# Patient Record
Sex: FEMALE | NOT HISPANIC OR LATINO | Employment: OTHER | URBAN - METROPOLITAN AREA
[De-identification: names, ages, dates, MRNs, and addresses within clinical notes are randomized per-mention and may not be internally consistent; named-entity substitution may affect disease eponyms.]

---

## 2019-08-02 ENCOUNTER — OFFICE VISIT (OUTPATIENT)
Dept: FAMILY MEDICINE CLINIC | Facility: CLINIC | Age: 39
End: 2019-08-02

## 2019-08-02 ENCOUNTER — HOSPITAL ENCOUNTER (OUTPATIENT)
Dept: GENERAL RADIOLOGY | Facility: HOSPITAL | Age: 39
Discharge: HOME | End: 2019-08-02
Admitting: FAMILY MEDICINE

## 2019-08-02 VITALS
TEMPERATURE: 98 F | SYSTOLIC BLOOD PRESSURE: 124 MMHG | OXYGEN SATURATION: 98 % | DIASTOLIC BLOOD PRESSURE: 82 MMHG | WEIGHT: 171.13 LBS | HEIGHT: 64 IN | HEART RATE: 83 BPM | BODY MASS INDEX: 29.21 KG/M2

## 2019-08-02 DIAGNOSIS — G89.29 CHRONIC LEFT SHOULDER PAIN: Primary | ICD-10-CM

## 2019-08-02 DIAGNOSIS — M25.512 CHRONIC LEFT SHOULDER PAIN: Primary | ICD-10-CM

## 2019-08-02 DIAGNOSIS — R10.13 EPIGASTRIC PAIN: ICD-10-CM

## 2019-08-02 PROCEDURE — 99214 OFFICE O/P EST MOD 30 MIN: CPT | Performed by: FAMILY MEDICINE

## 2019-08-02 PROCEDURE — 73030 X-RAY EXAM OF SHOULDER: CPT

## 2019-08-02 NOTE — PROGRESS NOTES
Subjective   Elva Dumont is a 38 y.o. female.     Chief Complaint   Patient presents with   • Shoulder Pain   • Abdominal Pain       Abdominal Pain   This is a new problem. The current episode started 1 to 4 weeks ago. The onset quality is gradual. The problem occurs 2 to 4 times per day. The problem has been gradually worsening. The pain is located in the epigastric region. The quality of the pain is dull and cramping. The abdominal pain radiates to the epigastric region and back. Associated symptoms include arthralgias. The pain is aggravated by eating. The pain is relieved by nothing. She has tried nothing for the symptoms. There is no history of abdominal surgery, colon cancer, Crohn's disease, gallstones, GERD, irritable bowel syndrome, pancreatitis, PUD or ulcerative colitis.   Shoulder Injury    The incident occurred at work (INJURY OSSURED 6 MONTHS AGO ,PT WALKER TO THE DOOR FRAME WITH LT SHOULDR AND FOR THE LAST 2 MONTHS PAIN HAS REOCCURED). The left shoulder is affected. The incident occurred more than 1 week ago. The injury mechanism was a direct blow. The quality of the pain is described as aching. The pain radiates to the back, chest and left arm. The pain is moderate. Associated symptoms include chest pain and muscle weakness. Pertinent negatives include no numbness or tingling. The symptoms are aggravated by movement and overhead lifting. She has tried acetaminophen for the symptoms. The treatment provided mild relief.          The following portions of the patient's history were reviewed and updated as appropriate: allergies, current medications, past family history, past medical history, past social history, past surgical history and problem list.    History reviewed. No pertinent past medical history.    History reviewed. No pertinent surgical history.    History reviewed. No pertinent family history.    Social History     Socioeconomic History   • Marital status: Single     Spouse name: Not on  file   • Number of children: Not on file   • Years of education: Not on file   • Highest education level: Not on file   Tobacco Use   • Smoking status: Never Smoker   • Smokeless tobacco: Never Used   Substance and Sexual Activity   • Alcohol use: No     Frequency: Never   • Drug use: No   • Sexual activity: Yes       Review of Systems   Cardiovascular: Positive for chest pain.   Gastrointestinal: Positive for abdominal pain.   Musculoskeletal: Positive for arthralgias.        LT SHOULDER PAIN   Neurological: Negative for tingling and numbness.       Objective   Vitals:    08/02/19 0937   BP: 124/82   Pulse: 83   Temp: 98 °F (36.7 °C)   SpO2: 98%     Body mass index is 29.37 kg/m².  Physical Exam   Constitutional: She appears well-developed and well-nourished. No distress.   Cardiovascular: Normal rate and regular rhythm. Exam reveals no friction rub.   No murmur heard.  Pulmonary/Chest: Effort normal and breath sounds normal. No stridor. No respiratory distress.   Abdominal: Soft. She exhibits no mass. There is tenderness (EPIGASTRIC). There is no rebound and no guarding. No hernia.   Musculoskeletal:   LT SHOULDER RROM AND (+) IMPINGEMENT SIGH   Skin: She is not diaphoretic.   Nursing note and vitals reviewed.        Assessment/Plan   Elva was seen today for shoulder pain and abdominal pain.    Diagnoses and all orders for this visit:    Chronic left shoulder pain  -     XR Shoulder 2+ View Left  -     MRI Shoulder Left Without Contrast; Future    Epigastric pain  -     CBC & Differential  -     H. Pylori Antigen, Stool - Stool, Per Rectum  -     Comprehensive Metabolic Panel  -     Amylase  -     Lipase

## 2019-08-03 LAB
ALBUMIN SERPL-MCNC: 4.5 G/DL (ref 3.5–5.2)
ALBUMIN/GLOB SERPL: 1.7 G/DL
ALP SERPL-CCNC: 70 U/L (ref 39–117)
ALT SERPL-CCNC: 11 U/L (ref 1–33)
AMYLASE SERPL-CCNC: 49 U/L (ref 28–100)
AST SERPL-CCNC: 13 U/L (ref 1–32)
BASOPHILS # BLD AUTO: 0.04 10*3/MM3 (ref 0–0.2)
BASOPHILS NFR BLD AUTO: 0.5 % (ref 0–1.5)
BILIRUB SERPL-MCNC: 0.5 MG/DL (ref 0.2–1.2)
BUN SERPL-MCNC: 12 MG/DL (ref 6–20)
BUN/CREAT SERPL: 18.2 (ref 7–25)
CALCIUM SERPL-MCNC: 8.7 MG/DL (ref 8.6–10.5)
CHLORIDE SERPL-SCNC: 101 MMOL/L (ref 98–107)
CO2 SERPL-SCNC: 27.3 MMOL/L (ref 22–29)
CREAT SERPL-MCNC: 0.66 MG/DL (ref 0.57–1)
EOSINOPHIL # BLD AUTO: 0.16 10*3/MM3 (ref 0–0.4)
EOSINOPHIL NFR BLD AUTO: 2 % (ref 0.3–6.2)
ERYTHROCYTE [DISTWIDTH] IN BLOOD BY AUTOMATED COUNT: 13.5 % (ref 12.3–15.4)
GLOBULIN SER CALC-MCNC: 2.6 GM/DL
GLUCOSE SERPL-MCNC: 99 MG/DL (ref 65–99)
HCT VFR BLD AUTO: 41.4 % (ref 34–46.6)
HGB BLD-MCNC: 13 G/DL (ref 12–15.9)
IMM GRANULOCYTES # BLD AUTO: 0.03 10*3/MM3 (ref 0–0.05)
IMM GRANULOCYTES NFR BLD AUTO: 0.4 % (ref 0–0.5)
LIPASE SERPL-CCNC: 36 U/L (ref 13–60)
LYMPHOCYTES # BLD AUTO: 1.6 10*3/MM3 (ref 0.7–3.1)
LYMPHOCYTES NFR BLD AUTO: 20.3 % (ref 19.6–45.3)
MCH RBC QN AUTO: 27.3 PG (ref 26.6–33)
MCHC RBC AUTO-ENTMCNC: 31.4 G/DL (ref 31.5–35.7)
MCV RBC AUTO: 86.8 FL (ref 79–97)
MONOCYTES # BLD AUTO: 0.48 10*3/MM3 (ref 0.1–0.9)
MONOCYTES NFR BLD AUTO: 6.1 % (ref 5–12)
NEUTROPHILS # BLD AUTO: 5.59 10*3/MM3 (ref 1.7–7)
NEUTROPHILS NFR BLD AUTO: 70.7 % (ref 42.7–76)
NRBC BLD AUTO-RTO: 0 /100 WBC (ref 0–0.2)
PLATELET # BLD AUTO: 260 10*3/MM3 (ref 140–450)
POTASSIUM SERPL-SCNC: 4.2 MMOL/L (ref 3.5–5.2)
PROT SERPL-MCNC: 7.1 G/DL (ref 6–8.5)
RBC # BLD AUTO: 4.77 10*6/MM3 (ref 3.77–5.28)
SODIUM SERPL-SCNC: 138 MMOL/L (ref 136–145)
WBC # BLD AUTO: 7.9 10*3/MM3 (ref 3.4–10.8)

## 2019-08-09 DIAGNOSIS — R10.13 EPIGASTRIC PAIN: Primary | ICD-10-CM

## 2019-08-11 LAB — H PYLORI AG STL QL IA: POSITIVE

## 2019-09-01 ENCOUNTER — APPOINTMENT (OUTPATIENT)
Dept: MRI IMAGING | Facility: HOSPITAL | Age: 39
End: 2019-09-01

## 2019-09-20 ENCOUNTER — HOSPITAL ENCOUNTER (OUTPATIENT)
Dept: MRI IMAGING | Facility: HOSPITAL | Age: 39
Discharge: HOME | End: 2019-09-20
Admitting: FAMILY MEDICINE

## 2019-09-20 DIAGNOSIS — G89.29 CHRONIC LEFT SHOULDER PAIN: ICD-10-CM

## 2019-09-20 DIAGNOSIS — M25.512 CHRONIC LEFT SHOULDER PAIN: ICD-10-CM

## 2019-09-20 PROCEDURE — 73221 MRI JOINT UPR EXTREM W/O DYE: CPT

## 2019-09-23 DIAGNOSIS — M75.02 ADHESIVE CAPSULITIS OF LEFT SHOULDER: Primary | ICD-10-CM

## 2019-10-01 DIAGNOSIS — M77.8 CAPSULITIS OF SHOULDER, LEFT: ICD-10-CM

## 2019-10-01 DIAGNOSIS — M25.512 CHRONIC LEFT SHOULDER PAIN: Primary | ICD-10-CM

## 2019-10-01 DIAGNOSIS — G89.29 CHRONIC LEFT SHOULDER PAIN: Primary | ICD-10-CM

## 2019-10-07 ENCOUNTER — TRANSCRIBE ORDERS (OUTPATIENT)
Dept: ADMINISTRATIVE | Facility: HOSPITAL | Age: 39
End: 2019-10-07

## 2019-10-07 DIAGNOSIS — M77.8 CAPSULITIS OF LEFT SHOULDER: ICD-10-CM

## 2019-10-07 DIAGNOSIS — M25.512 CHRONIC LEFT SHOULDER PAIN: Primary | ICD-10-CM

## 2019-10-07 DIAGNOSIS — G89.29 CHRONIC LEFT SHOULDER PAIN: Primary | ICD-10-CM

## 2019-10-14 ENCOUNTER — OFFICE VISIT (OUTPATIENT)
Dept: ORTHOPEDIC SURGERY | Facility: CLINIC | Age: 39
End: 2019-10-14

## 2019-10-14 VITALS — WEIGHT: 173 LBS | HEIGHT: 64 IN | BODY MASS INDEX: 29.53 KG/M2 | TEMPERATURE: 98.1 F

## 2019-10-14 DIAGNOSIS — M75.42 IMPINGEMENT SYNDROME OF SHOULDER REGION, LEFT: ICD-10-CM

## 2019-10-14 DIAGNOSIS — M25.512 CHRONIC LEFT SHOULDER PAIN: Primary | ICD-10-CM

## 2019-10-14 DIAGNOSIS — G89.29 CHRONIC LEFT SHOULDER PAIN: Primary | ICD-10-CM

## 2019-10-14 PROCEDURE — 20610 DRAIN/INJ JOINT/BURSA W/O US: CPT | Performed by: ORTHOPAEDIC SURGERY

## 2019-10-14 PROCEDURE — 99203 OFFICE O/P NEW LOW 30 MIN: CPT | Performed by: ORTHOPAEDIC SURGERY

## 2019-10-14 RX ADMIN — METHYLPREDNISOLONE ACETATE 80 MG: 80 INJECTION, SUSPENSION INTRA-ARTICULAR; INTRALESIONAL; INTRAMUSCULAR; SOFT TISSUE at 16:12

## 2019-10-14 NOTE — PROGRESS NOTES
Patient: Elva Dumont    YOB: 1980    Medical Record Number: [id number]    Chief Complaints:   Left shoulder pain    History of Present Illness:     38 y.o. female patient who presents with left shoulder pain.  Her symptoms began about 6 months ago.  She does not recall any injury.  Pain is moderate, constant and stabbing.  Symptoms are worse with driving and at night.  Denies any weakness, numbness, shooting pain down her arm.  She is right-hand dominant.  She is self-employed.    Allergies: No Known Allergies    Medications:   Home Medications:  No current outpatient medications on file.    History reviewed. No pertinent past medical history.    History reviewed. No pertinent surgical history.    Social History     Occupational History   • Not on file   Tobacco Use   • Smoking status: Never Smoker   • Smokeless tobacco: Never Used   Substance and Sexual Activity   • Alcohol use: No     Frequency: Never   • Drug use: No   • Sexual activity: Yes      Social History     Social History Narrative   • Not on file       History reviewed. No pertinent family history.    Review of Systems:      Constitutional: Denies fever, shaking or chills   Eyes: Denies change in visual acuity   HEENT: Denies nasal congestion or sore throat   Respiratory: Denies cough or shortness of breath   Cardiovascular: Denies chest pain or edema  Endocrine: Denies tremors, palpitations, intolerance of heat or cold, polyuria, polydipsia.  GI: Denies abdominal pain, nausea, vomiting, bloody stools or diarrhea  : Denies frequency, urgency, incontinence, retention, or nocturia.  Musculoskeletal: Denies numbness, tingling or loss of motor function except as above  Integument: Denies rash, lesion or ulceration   Neurologic: Denies headache or focal weakness, deficits  Heme: Denies spontaneous or excessive bleeding, epistaxis, hematuria, melena, fatigue, enlarged or tender lymph nodes.      All other pertinent positives and negatives  "as noted above in HPI.    Physical Exam: 38 y.o. female  Vitals:    10/14/19 1554   Temp: 98.1 °F (36.7 °C)   TempSrc: Temporal   Weight: 78.5 kg (173 lb)   Height: 162.6 cm (64\")     General:  Patient is awake and alert.  Appears in no acute distress or discomfort.    Psych:  Affect and demeanor are appropriate.    Eyes:  Conjunctiva and sclera appear grossly normal.  Eyes track well and EOM seem to be intact.    Ears:  No gross abnormalities.  Hearing adequate for the exam.    Dentition:  No gross abnormalities noted.    Cardiovascular:  Regular rate and rhythm.    Lungs:  Good chest expansion.  Breathing unlabored.    Lymph:  No palpable adenopathy about neck or axilla.    Neck:  Supple.  Normal ROM.  Negative Spurling's for shoulder or arm pain.    Left shoulder is examined.  Skin is benign.  No gross abnormalities on inspection including any atrophy, swellings, or masses.  No palpable masses or adenopathy. No focal tenderness.  Full shoulder motion.  No evident instability or apprehension.  Positive Neer and Dallas manuevers.  Negative active compression maneuver.  Negative Speeds and Yergasons manuevers.  Good strength in her rotator cuff, deltoid, biceps, triceps, and .  Intact sensation throughout the arm.  Palpable radial pulse.  Good skin turgor.  Brisk cap refill.         Radiology: Outside AP and rotated AP views of the left shoulder are reviewed.  I have also reviewed the radiology report.  No significant findings noted.  MRI of the left shoulder is reviewed along with the associated report.  Overall, the MRI appears relatively benign.  There is some thickened tissue anteriorly which may or may not represent a small area of adhesive capsulitis or hypertrophied labral tissue    Assessment/Plan:   Left shoulder impingement    I am not convinced that the finding noted on her MRI accounts for her pain.  She examines like this is impingement.  I think it prudent to attempt a trial of conservative " treatment.  I recommended an injection and some therapy.  The risks, benefits and alternatives to the injection were thoroughly discussed.  She consented.  I've entered a referral to physical therapy.  I told her that I would like to give it a month of conservative treatment.  She has an MR arthrogram scheduled in about 3 weeks.  I recommended that she push that back about a month.  If she is still having symptoms at that time, then I think it is a good idea to go ahead and get the MR arthrogram.  I told her to call me in about a month if she is still having symptoms.    Ruddy Nevarez MD    10/14/2019    CC to Moon Arizmendi MD     Large Joint Arthrocentesis: L subacromial bursa  Date/Time: 10/14/2019 4:12 PM  Consent given by: patient  Site marked: site marked  Timeout: Immediately prior to procedure a time out was called to verify the correct patient, procedure, equipment, support staff and site/side marked as required   Supporting Documentation  Indications: pain   Procedure Details  Location: shoulder - L subacromial bursa  Preparation: Patient was prepped and draped in the usual sterile fashion  Needle gauge: 21.  Approach: posterior  Medications administered: 80 mg methylPREDNISolone acetate 80 MG/ML; 2 mL lidocaine (cardiac)  Patient tolerance: patient tolerated the procedure well with no immediate complications

## 2019-10-15 RX ORDER — METHYLPREDNISOLONE ACETATE 80 MG/ML
80 INJECTION, SUSPENSION INTRA-ARTICULAR; INTRALESIONAL; INTRAMUSCULAR; SOFT TISSUE
Status: COMPLETED | OUTPATIENT
Start: 2019-10-14 | End: 2019-10-14

## 2019-11-21 DIAGNOSIS — M25.512 CHRONIC LEFT SHOULDER PAIN: Primary | ICD-10-CM

## 2019-11-21 DIAGNOSIS — G89.29 CHRONIC LEFT SHOULDER PAIN: Primary | ICD-10-CM

## 2019-12-12 ENCOUNTER — APPOINTMENT (OUTPATIENT)
Dept: MRI IMAGING | Facility: HOSPITAL | Age: 39
End: 2019-12-12

## 2020-01-29 ENCOUNTER — OFFICE VISIT (OUTPATIENT)
Dept: FAMILY MEDICINE CLINIC | Facility: CLINIC | Age: 40
End: 2020-01-29

## 2020-01-29 VITALS
HEART RATE: 89 BPM | BODY MASS INDEX: 28.94 KG/M2 | TEMPERATURE: 98.4 F | DIASTOLIC BLOOD PRESSURE: 74 MMHG | SYSTOLIC BLOOD PRESSURE: 110 MMHG | OXYGEN SATURATION: 98 % | HEIGHT: 64 IN | WEIGHT: 169.5 LBS | RESPIRATION RATE: 18 BRPM

## 2020-01-29 DIAGNOSIS — N39.0 URINARY TRACT INFECTION WITH HEMATURIA, SITE UNSPECIFIED: Primary | ICD-10-CM

## 2020-01-29 DIAGNOSIS — R31.9 URINARY TRACT INFECTION WITH HEMATURIA, SITE UNSPECIFIED: Primary | ICD-10-CM

## 2020-01-29 DIAGNOSIS — R35.0 FREQUENCY OF URINATION: ICD-10-CM

## 2020-01-29 LAB
BILIRUB BLD-MCNC: NEGATIVE MG/DL
CLARITY, POC: CLEAR
COLOR UR: YELLOW
GLUCOSE UR STRIP-MCNC: NEGATIVE MG/DL
KETONES UR QL: ABNORMAL
LEUKOCYTE EST, POC: NEGATIVE
NITRITE UR-MCNC: NEGATIVE MG/ML
PH UR: 5.5 [PH] (ref 5–8)
PROT UR STRIP-MCNC: NEGATIVE MG/DL
RBC # UR STRIP: ABNORMAL /UL
SP GR UR: 1.03 (ref 1–1.03)
UROBILINOGEN UR QL: NORMAL

## 2020-01-29 PROCEDURE — 99213 OFFICE O/P EST LOW 20 MIN: CPT | Performed by: FAMILY MEDICINE

## 2020-01-29 RX ORDER — SULFAMETHOXAZOLE AND TRIMETHOPRIM 800; 160 MG/1; MG/1
1 TABLET ORAL 2 TIMES DAILY
Qty: 14 TABLET | Refills: 0 | Status: SHIPPED | OUTPATIENT
Start: 2020-01-29 | End: 2020-01-29

## 2020-01-29 RX ORDER — PHENAZOPYRIDINE HYDROCHLORIDE 200 MG/1
200 TABLET, FILM COATED ORAL 3 TIMES DAILY
Qty: 6 TABLET | Refills: 0 | Status: SHIPPED | OUTPATIENT
Start: 2020-01-29 | End: 2020-01-29

## 2020-01-29 RX ORDER — PHENAZOPYRIDINE HYDROCHLORIDE 200 MG/1
200 TABLET, FILM COATED ORAL 3 TIMES DAILY
Qty: 6 TABLET | Refills: 0 | Status: SHIPPED | OUTPATIENT
Start: 2020-01-29 | End: 2020-02-25

## 2020-01-29 RX ORDER — SULFAMETHOXAZOLE AND TRIMETHOPRIM 800; 160 MG/1; MG/1
1 TABLET ORAL 2 TIMES DAILY
Qty: 14 TABLET | Refills: 0 | Status: SHIPPED | OUTPATIENT
Start: 2020-01-29 | End: 2020-02-01 | Stop reason: ALTCHOICE

## 2020-01-29 NOTE — PROGRESS NOTES
Subjective   Elva Dumont is a 39 y.o. female.     Chief Complaint   Patient presents with   • Urinary Frequency     has been going on for 3 days    • Flank Pain       Dysuria x 3 days and no fever and back pain, and urgency, not pregnant, LMP just off, has IUD         The following portions of the patient's history were reviewed and updated as appropriate: allergies, current medications, past family history, past medical history, past social history, past surgical history and problem list.    History reviewed. No pertinent past medical history.    History reviewed. No pertinent surgical history.    History reviewed. No pertinent family history.    Social History     Socioeconomic History   • Marital status: Single     Spouse name: Not on file   • Number of children: Not on file   • Years of education: Not on file   • Highest education level: Not on file   Tobacco Use   • Smoking status: Never Smoker   • Smokeless tobacco: Never Used   Substance and Sexual Activity   • Alcohol use: No     Frequency: Never   • Drug use: No   • Sexual activity: Yes       Review of Systems   Constitutional: Negative.    HENT: Negative.    Respiratory: Negative.    Cardiovascular: Negative.    Gastrointestinal: Negative.    Genitourinary: Positive for dysuria, flank pain and frequency.   Skin: Negative.    Neurological: Negative.    Hematological: Negative.    Psychiatric/Behavioral: Negative.        Objective   Vitals:    01/29/20 1503   BP: 110/74   Pulse: 89   Resp: 18   Temp: 98.4 °F (36.9 °C)   SpO2: 98%     Body mass index is 29.08 kg/m².  Physical Exam   Constitutional: She is oriented to person, place, and time. She appears well-developed and well-nourished.   HENT:   Head: Normocephalic and atraumatic.   Right Ear: External ear normal.   Left Ear: External ear normal.   Nose: Nose normal.   Mouth/Throat: Oropharynx is clear and moist.   Eyes: Pupils are equal, round, and reactive to light. Conjunctivae and EOM are normal.    Neck: Normal range of motion. Neck supple. No tracheal deviation present. No thyromegaly present.   Cardiovascular: Normal rate, regular rhythm and normal heart sounds. Exam reveals no gallop and no friction rub.   No murmur heard.  Pulmonary/Chest: Effort normal and breath sounds normal. No respiratory distress. She exhibits no tenderness.   Abdominal: Soft. Bowel sounds are normal. She exhibits no distension. There is no tenderness.   Musculoskeletal: Normal range of motion. She exhibits no edema or tenderness.   Lymphadenopathy:     She has no cervical adenopathy.   Neurological: She is alert and oriented to person, place, and time. She displays normal reflexes. No cranial nerve deficit or sensory deficit. Coordination normal.   Skin: Skin is warm and dry.   Psychiatric: She has a normal mood and affect. Her behavior is normal. Judgment and thought content normal.   Nursing note and vitals reviewed.        Assessment/Plan   Elva was seen today for urinary frequency and flank pain.    Diagnoses and all orders for this visit:    Frequency of urination  -     POC Urinalysis Dipstick, Automated    Urinary tract infection with hematuria, site unspecified  -     Urine Culture - Urine, Urine, Clean Catch  -     sulfamethoxazole-trimethoprim (BACTRIM DS) 800-160 MG per tablet; Take 1 tablet by mouth 2 (Two) Times a Day.  -     phenazopyridine (PYRIDIUM) 200 MG tablet; Take 1 tablet by mouth 3 (Three) Times a Day.

## 2020-02-01 DIAGNOSIS — R31.9 URINARY TRACT INFECTION WITH HEMATURIA, SITE UNSPECIFIED: Primary | ICD-10-CM

## 2020-02-01 DIAGNOSIS — N39.0 URINARY TRACT INFECTION WITH HEMATURIA, SITE UNSPECIFIED: Primary | ICD-10-CM

## 2020-02-01 LAB
BACTERIA UR CULT: ABNORMAL
BACTERIA UR CULT: ABNORMAL
OTHER ANTIBIOTIC SUSC ISLT: ABNORMAL

## 2020-02-01 RX ORDER — AMOXICILLIN AND CLAVULANATE POTASSIUM 875; 125 MG/1; MG/1
1 TABLET, FILM COATED ORAL 2 TIMES DAILY
Qty: 20 TABLET | Refills: 0 | Status: SHIPPED | OUTPATIENT
Start: 2020-02-01 | End: 2020-02-11

## 2020-02-25 ENCOUNTER — OFFICE VISIT (OUTPATIENT)
Dept: FAMILY MEDICINE CLINIC | Facility: CLINIC | Age: 40
End: 2020-02-25

## 2020-02-25 ENCOUNTER — TELEPHONE (OUTPATIENT)
Dept: FAMILY MEDICINE CLINIC | Facility: CLINIC | Age: 40
End: 2020-02-25

## 2020-02-25 VITALS
BODY MASS INDEX: 27.7 KG/M2 | HEART RATE: 79 BPM | DIASTOLIC BLOOD PRESSURE: 78 MMHG | OXYGEN SATURATION: 97 % | TEMPERATURE: 97.7 F | HEIGHT: 64 IN | WEIGHT: 162.25 LBS | SYSTOLIC BLOOD PRESSURE: 116 MMHG

## 2020-02-25 DIAGNOSIS — R10.30 LOWER ABDOMINAL PAIN: ICD-10-CM

## 2020-02-25 DIAGNOSIS — K62.89 RECTAL PAIN: ICD-10-CM

## 2020-02-25 DIAGNOSIS — R31.9 HEMATURIA, UNSPECIFIED TYPE: ICD-10-CM

## 2020-02-25 DIAGNOSIS — N39.0 URINARY TRACT INFECTION WITHOUT HEMATURIA, SITE UNSPECIFIED: Primary | ICD-10-CM

## 2020-02-25 LAB
BILIRUB BLD-MCNC: NEGATIVE MG/DL
CLARITY, POC: CLEAR
COLOR UR: YELLOW
GLUCOSE UR STRIP-MCNC: NEGATIVE MG/DL
KETONES UR QL: NEGATIVE
LEUKOCYTE EST, POC: NEGATIVE
NITRITE UR-MCNC: NEGATIVE MG/ML
PH UR: 6 [PH] (ref 5–8)
PROT UR STRIP-MCNC: NEGATIVE MG/DL
RBC # UR STRIP: ABNORMAL /UL
SP GR UR: 1.02 (ref 1–1.03)
UROBILINOGEN UR QL: NORMAL

## 2020-02-25 PROCEDURE — 99213 OFFICE O/P EST LOW 20 MIN: CPT | Performed by: FAMILY MEDICINE

## 2020-02-25 NOTE — PROGRESS NOTES
Subjective   Elva Dumont is a 39 y.o. female.     Chief Complaint   Patient presents with   • follow up from UTI and admitt to [facility].       History of Present Illness     Follow-up from hospitalization.  For urinary tract infection.  Asymptomatic, recovered.  Here to recheck on her urine.    The following portions of the patient's history were reviewed and updated as appropriate: allergies, current medications, past family history, past medical history, past social history, past surgical history and problem list.    No past medical history on file.    No past surgical history on file.    No family history on file.    Social History     Socioeconomic History   • Marital status: Single     Spouse name: Not on file   • Number of children: Not on file   • Years of education: Not on file   • Highest education level: Not on file   Tobacco Use   • Smoking status: Never Smoker   • Smokeless tobacco: Never Used   Substance and Sexual Activity   • Alcohol use: No     Frequency: Never   • Drug use: No   • Sexual activity: Yes       Current Outpatient Medications on File Prior to Visit   Medication Sig Dispense Refill   • [DISCONTINUED] phenazopyridine (PYRIDIUM) 200 MG tablet Take 1 tablet by mouth 3 (Three) Times a Day. 6 tablet 0     No current facility-administered medications on file prior to visit.        Review of Systems   Gastrointestinal: Positive for abdominal distention, abdominal pain and rectal pain.   Genitourinary: Positive for frequency.       Recent Results (from the past 4704 hour(s))   POC Urinalysis Dipstick, Automated    Collection Time: 01/29/20  3:21 PM   Result Value Ref Range    Color Yellow Yellow, Straw, Dark Yellow, Deborah    Clarity, UA Clear Clear    Specific Gravity  1.030 1.005 - 1.030    pH, Urine 5.5 5.0 - 8.0    Leukocytes Negative Negative    Nitrite, UA Negative Negative    Protein, POC Negative Negative mg/dL    Glucose, UA Negative Negative, 1000 mg/dL (3+) mg/dL    Ketones, UA 3+ (A)  "Negative    Urobilinogen, UA Normal Normal    Bilirubin Negative Negative    Blood, UA 2+ (A) Negative   Urine Culture - Urine, Urine, Clean Catch    Collection Time: 01/29/20  5:08 PM   Result Value Ref Range    Urine Culture Final report (A)     Result 1 Escherichia coli (A)     Susceptibility Testing Comment    POC Urinalysis Dipstick, Automated    Collection Time: 02/25/20  2:32 PM   Result Value Ref Range    Color Yellow Yellow, Straw, Dark Yellow, Deborah    Clarity, UA Clear Clear    Specific Gravity  1.025 1.005 - 1.030    pH, Urine 6.0 5.0 - 8.0    Leukocytes Negative Negative    Nitrite, UA Negative Negative    Protein, POC Negative Negative mg/dL    Glucose, UA Negative Negative, 1000 mg/dL (3+) mg/dL    Ketones, UA Negative Negative    Urobilinogen, UA Normal Normal    Bilirubin Negative Negative    Blood, UA 1+ (A) Negative     Objective   Vitals:    02/25/20 1317   BP: 116/78   Pulse: 79   Temp: 97.7 °F (36.5 °C)   SpO2: 97%   Weight: 73.6 kg (162 lb 4 oz)   Height: 162.6 cm (64.02\")     Body mass index is 27.84 kg/m².  Physical Exam   Constitutional: She appears well-developed and well-nourished. No distress.   Cardiovascular: Normal rate and regular rhythm.   Pulmonary/Chest: Effort normal and breath sounds normal. No respiratory distress. She has no wheezes.   Skin: She is not diaphoretic.   Nursing note and vitals reviewed.        Assessment/Plan   Elva was seen today for follow up from uti and admitt to [facility]..    Diagnoses and all orders for this visit:    Urinary tract infection without hematuria, site unspecified  -     POC Urinalysis Dipstick, Automated    Rectal pain  -     Ambulatory Referral to Gastroenterology    Lower abdominal pain  -     Ambulatory Referral to Gastroenterology      Return if symptoms worsen or fail to improve.         "

## 2020-02-27 ENCOUNTER — OFFICE VISIT (OUTPATIENT)
Dept: GASTROENTEROLOGY | Facility: CLINIC | Age: 40
End: 2020-02-27

## 2020-02-27 VITALS
TEMPERATURE: 98.4 F | BODY MASS INDEX: 27.39 KG/M2 | DIASTOLIC BLOOD PRESSURE: 74 MMHG | SYSTOLIC BLOOD PRESSURE: 128 MMHG | HEIGHT: 64 IN | WEIGHT: 160.4 LBS

## 2020-02-27 DIAGNOSIS — K62.89 ANAL OR RECTAL PAIN: ICD-10-CM

## 2020-02-27 DIAGNOSIS — R10.30 LOWER ABDOMINAL PAIN: Primary | ICD-10-CM

## 2020-02-27 PROCEDURE — 99204 OFFICE O/P NEW MOD 45 MIN: CPT | Performed by: INTERNAL MEDICINE

## 2020-02-27 NOTE — PROGRESS NOTES
Chief Complaint   Patient presents with   • Rectal Pain        Elva Dumont is a  39 y.o. female here for an initial visit for right and left lower quadrant abdominal pain, rectal pain.  This 39-year-old white female patient of Dr. Moon Arizmendi presents with complaints of rectal and lower abdominal pain on an intermittent basis for the past year.  She described the pain as being sharp in quality and occurring always at nighttime where she will awaken from sleep with the pain and despite changing position or attempting to defecate the pain will persist until she uses some anti-inflammatory medication.  She notes approximately 2 episodes per month.  She has had issues in the past with hemorrhoids after childbirth and the pain is not the same as what she has experienced with hemorrhoidal problems.  She has routine bowel function usually once per day no reported melena or bright red blood per rectum.  Her weight is been stable.  No history of any rectal trauma that she can recount.            No past medical history on file.    No current outpatient medications on file.     No current facility-administered medications for this visit.        PRN Meds:.    No Known Allergies    Social History     Socioeconomic History   • Marital status: Single     Spouse name: Not on file   • Number of children: Not on file   • Years of education: Not on file   • Highest education level: Not on file   Tobacco Use   • Smoking status: Never Smoker   • Smokeless tobacco: Never Used   Substance and Sexual Activity   • Alcohol use: Yes     Frequency: Never     Comment: msocial   • Drug use: No   • Sexual activity: Yes       No family history on file.    Review of Systems   Constitutional: Negative for activity change, appetite change, fatigue and unexpected weight change.   HENT: Negative for congestion, facial swelling, sore throat, trouble swallowing and voice change.    Eyes: Negative for photophobia and visual disturbance.    Respiratory: Negative for cough and choking.    Cardiovascular: Negative for chest pain.   Gastrointestinal: Positive for abdominal pain. Negative for abdominal distention, anal bleeding, blood in stool, constipation, diarrhea, nausea, rectal pain and vomiting.        Rectal pain   Endocrine: Negative for polyphagia.   Musculoskeletal: Negative for arthralgias, gait problem and joint swelling.   Skin: Negative for color change, pallor and rash.   Allergic/Immunologic: Negative for food allergies.   Neurological: Negative for speech difficulty and headaches.   Hematological: Does not bruise/bleed easily.   Psychiatric/Behavioral: Negative for agitation, confusion and sleep disturbance.       Vitals:    02/27/20 1308   BP: 128/74   Temp: 98.4 °F (36.9 °C)       Physical Exam   Constitutional: She is oriented to person, place, and time. She appears well-developed and well-nourished. No distress.   HENT:   Head: Normocephalic.   Mouth/Throat: Oropharynx is clear and moist. No oropharyngeal exudate.   Eyes: Conjunctivae and EOM are normal. No scleral icterus.   Neck: Normal range of motion. No thyromegaly present.   Cardiovascular: Normal rate and regular rhythm.   No murmur heard.  Pulmonary/Chest: Breath sounds normal. No respiratory distress. She has no wheezes. She has no rales.   Abdominal: Soft. Bowel sounds are normal. She exhibits no distension and no mass. There is no hepatosplenomegaly. There is no tenderness.   Musculoskeletal: Normal range of motion. She exhibits no edema or tenderness.   Lymphadenopathy:     She has no cervical adenopathy.   Neurological: She is alert and oriented to person, place, and time.   Skin: Skin is warm and dry. No rash noted. She is not diaphoretic. No erythema.   Psychiatric: She has a normal mood and affect. Her behavior is normal.   Vitals reviewed.      ASSESSMENT  #1 lower abdominal pain: Patient has undergone previous gynecologic evaluation including ultrasound with  negative findings  #2 rectal pain: Not consistent with hemorrhoidal source, may be associated with tenesmus      PLAN  Schedule colonoscopy  Consider anorectal manometry pending endoscopic results      ICD-10-CM ICD-9-CM   1. Lower abdominal pain R10.30 789.09   2. Anal or rectal pain K62.89 569.42

## 2020-04-24 ENCOUNTER — TELEMEDICINE (OUTPATIENT)
Dept: FAMILY MEDICINE CLINIC | Facility: CLINIC | Age: 40
End: 2020-04-24

## 2020-04-24 DIAGNOSIS — M79.602 ARM PAIN, ANTERIOR, LEFT: Primary | ICD-10-CM

## 2020-04-24 DIAGNOSIS — M25.522 ELBOW PAIN, CHRONIC, LEFT: ICD-10-CM

## 2020-04-24 DIAGNOSIS — G89.29 ELBOW PAIN, CHRONIC, LEFT: ICD-10-CM

## 2020-04-24 PROCEDURE — 99214 OFFICE O/P EST MOD 30 MIN: CPT | Performed by: FAMILY MEDICINE

## 2020-04-24 RX ORDER — IBUPROFEN 800 MG/1
800 TABLET ORAL EVERY 8 HOURS PRN
Qty: 90 TABLET | Refills: 1 | Status: SHIPPED | OUTPATIENT
Start: 2020-04-24

## 2020-04-24 NOTE — PROGRESS NOTES
Subjective   Elva Dumont is a 39 y.o. female.   Consent given  Via Doximity  Time 15 min    Cc: lt arm pain after IV :2 months ago in the hospital, weakness and pain    History of Present Illness   C/o lt arm elbow and lt arm pain for 2 months. Was in hospital with IVF for a long time in lt arm and developed pain  And weakness. Local remedies and Tylenol are not helping.    The following portions of the patient's history were reviewed and updated as appropriate: allergies, current medications, past family history, past medical history, past social history, past surgical history and problem list.    No past medical history on file.    Past Surgical History:   Procedure Laterality Date   • ABDOMINOPLASTY     • BREAST AUGMENTATION         No family history on file.    Social History     Socioeconomic History   • Marital status: Single     Spouse name: Not on file   • Number of children: Not on file   • Years of education: Not on file   • Highest education level: Not on file   Tobacco Use   • Smoking status: Never Smoker   • Smokeless tobacco: Never Used   Substance and Sexual Activity   • Alcohol use: Yes     Frequency: Never     Comment: msocial   • Drug use: No   • Sexual activity: Yes       No current outpatient medications on file prior to visit.     No current facility-administered medications on file prior to visit.        Review of Systems   Musculoskeletal:        Lt arm pain   All other systems reviewed and are negative.      Recent Results (from the past 4704 hour(s))   POC Urinalysis Dipstick, Automated    Collection Time: 01/29/20  3:21 PM   Result Value Ref Range    Color Yellow Yellow, Straw, Dark Yellow, Deborah    Clarity, UA Clear Clear    Specific Gravity  1.030 1.005 - 1.030    pH, Urine 5.5 5.0 - 8.0    Leukocytes Negative Negative    Nitrite, UA Negative Negative    Protein, POC Negative Negative mg/dL    Glucose, UA Negative Negative, 1000 mg/dL (3+) mg/dL    Ketones, UA 3+ (A) Negative     Urobilinogen, UA Normal Normal    Bilirubin Negative Negative    Blood, UA 2+ (A) Negative   Urine Culture - Urine, Urine, Clean Catch    Collection Time: 01/29/20  5:08 PM   Result Value Ref Range    Urine Culture Final report (A)     Result 1 Escherichia coli (A)     Susceptibility Testing Comment    POC Urinalysis Dipstick, Automated    Collection Time: 02/25/20  2:32 PM   Result Value Ref Range    Color Yellow Yellow, Straw, Dark Yellow, Deborah    Clarity, UA Clear Clear    Specific Gravity  1.025 1.005 - 1.030    pH, Urine 6.0 5.0 - 8.0    Leukocytes Negative Negative    Nitrite, UA Negative Negative    Protein, POC Negative Negative mg/dL    Glucose, UA Negative Negative, 1000 mg/dL (3+) mg/dL    Ketones, UA Negative Negative    Urobilinogen, UA Normal Normal    Bilirubin Negative Negative    Blood, UA 1+ (A) Negative     Objective   There were no vitals filed for this visit.  There is no height or weight on file to calculate BMI.  Physical Exam      Assessment/Plan   Diagnoses and all orders for this visit:    Arm pain, anterior, left  -     ibuprofen (ADVIL,MOTRIN) 800 MG tablet; Take 1 tablet by mouth Every 8 (Eight) Hours As Needed for Mild Pain .  -     EMG & Nerve Conduction Test; Future    Elbow pain, chronic, left  -     ibuprofen (ADVIL,MOTRIN) 800 MG tablet; Take 1 tablet by mouth Every 8 (Eight) Hours As Needed for Mild Pain .  -     EMG & Nerve Conduction Test; Future      Follow up prn.

## 2020-06-05 ENCOUNTER — HOSPITAL ENCOUNTER (OUTPATIENT)
Dept: INFUSION THERAPY | Facility: HOSPITAL | Age: 40
Discharge: HOME | End: 2020-06-05
Admitting: PHYSICAL MEDICINE & REHABILITATION

## 2020-06-05 DIAGNOSIS — G89.29 ELBOW PAIN, CHRONIC, LEFT: ICD-10-CM

## 2020-06-05 DIAGNOSIS — M79.602 ARM PAIN, ANTERIOR, LEFT: ICD-10-CM

## 2020-06-05 DIAGNOSIS — M25.522 ELBOW PAIN, CHRONIC, LEFT: ICD-10-CM

## 2020-06-05 PROCEDURE — 95909 NRV CNDJ TST 5-6 STUDIES: CPT

## 2020-06-05 PROCEDURE — 95886 MUSC TEST DONE W/N TEST COMP: CPT

## 2020-06-05 NOTE — PROCEDURES
HISTORY:      The patient is a 39-year-old right hand dominant female who presents with a complaint of pain, numbness and tingling, and weakness of the left upper extremity.  She states that she has been experiencing the symptoms for 4 months.           I.  NERVE CONDUCTION STUDIES:       The distal latency of the left median motor nerve is 3.9 ms.  The amplitude of evoked response is 4.2 mV.  The conduction velocity is 58 m/sec.       The distal latency of the left ulnar motor nerve is 3.3 ms.  The amplitude of evoked response is 4.8 mV at the wrist, 4.6 mV below the elbow, and 4.3 mV above the elbow.  The conduction velocity is 64 m/sec below the elbow and 54 mv above the elbow.       The distal latency of left median sensory nerve at 14 cm is 2.6 ms.  The amplitude of evoked response is 22 microvolts.     The distal latency of left ulnar sensory nerve at 14 cm is 2.7 ms.  The amplitude of evoked response is 17 microvolts.     The distal latency of the left median sensory at 10 cm is 2.1 ms; the distal latency of the left radial sensory nerve at 10 cm is 2.1 ms (normal difference less than or equal to 0.4 ms).       II.  ELECTROMYOGRAPHY:       Electromyography was performed on the following muscles of the left upper extremity using a monopolar needle: Deltoid, biceps, brachioradialis, flexor carpi radialis, flexor carpi ulnaris, 1st dorsal interosseous, and abductor pollicis brevis.      There were no changes in insertional activity. There were no denervation potentials present.      The motor unit action potentials were of normal height and duration. The recruitment patterns were normal.       III.  IMPRESSION:        1. Normal nerve conduction studies.    2. Normal electromyographic examination.    3. Normal study.  There is no electrophysiologic evidence of a neuropathic process.    4. There is no electrophysiologic evidence of a focal neuropathy, brachial plexopathy, or cervical radiculopathy.      Lucita  FELICIA Grider M.D.*  6/5/2020  10:03

## 2020-06-22 ENCOUNTER — OUTSIDE FACILITY SERVICE (OUTPATIENT)
Dept: GASTROENTEROLOGY | Facility: CLINIC | Age: 40
End: 2020-06-22

## 2020-06-22 PROCEDURE — 45380 COLONOSCOPY AND BIOPSY: CPT | Performed by: INTERNAL MEDICINE

## 2020-06-30 ENCOUNTER — TELEPHONE (OUTPATIENT)
Dept: GASTROENTEROLOGY | Facility: CLINIC | Age: 40
End: 2020-06-30

## 2020-07-10 NOTE — TELEPHONE ENCOUNTER
Call to pt.  Advise per path report that polyp that was removed was not cancerous, but precancerous.  Rectal bx unremarkable.     Advise per DR Diaz note.  Verb understanding.  States not currently having any issues - will call back as needed.

## 2021-01-07 ENCOUNTER — TELEPHONE (OUTPATIENT)
Dept: FAMILY MEDICINE CLINIC | Facility: CLINIC | Age: 41
End: 2021-01-07

## 2021-01-07 DIAGNOSIS — Z71.84 COUNSELING FOR TRAVEL: Primary | ICD-10-CM

## 2021-01-07 DIAGNOSIS — Z71.84 COUNSELING FOR TRAVEL: ICD-10-CM

## 2021-01-07 NOTE — TELEPHONE ENCOUNTER
Caller: Elva Dumont    Relationship to patient: Self    Best call back number: [phone]    Patient is needing: PATIENT IS NEEDING A REFERRAL FOR A COVID TEST AND STATES THAT IT IS URGENT AND NEEDS ASAP

## 2021-01-09 LAB — SARS-COV-2 RNA RESP QL NAA+PROBE: NOT DETECTED

## 2021-02-26 ENCOUNTER — OFFICE VISIT (OUTPATIENT)
Dept: FAMILY MEDICINE CLINIC | Facility: CLINIC | Age: 41
End: 2021-02-26

## 2021-02-26 VITALS
HEIGHT: 64 IN | HEART RATE: 74 BPM | WEIGHT: 170 LBS | BODY MASS INDEX: 29.02 KG/M2 | DIASTOLIC BLOOD PRESSURE: 68 MMHG | OXYGEN SATURATION: 98 % | SYSTOLIC BLOOD PRESSURE: 118 MMHG | TEMPERATURE: 97.1 F

## 2021-02-26 DIAGNOSIS — Z00.00 HEALTH MAINTENANCE EXAMINATION: ICD-10-CM

## 2021-02-26 DIAGNOSIS — M54.40 ACUTE LOW BACK PAIN WITH SCIATICA, SCIATICA LATERALITY UNSPECIFIED, UNSPECIFIED BACK PAIN LATERALITY: ICD-10-CM

## 2021-02-26 DIAGNOSIS — R53.82 CHRONIC FATIGUE: ICD-10-CM

## 2021-02-26 DIAGNOSIS — N30.01 ACUTE CYSTITIS WITH HEMATURIA: Primary | ICD-10-CM

## 2021-02-26 DIAGNOSIS — R10.9 FLANK PAIN: ICD-10-CM

## 2021-02-26 DIAGNOSIS — R31.1 BENIGN ESSENTIAL MICROSCOPIC HEMATURIA: ICD-10-CM

## 2021-02-26 PROBLEM — B37.31 YEAST INFECTION OF THE VAGINA: Status: ACTIVE | Noted: 2017-12-28

## 2021-02-26 PROBLEM — Z98.890 S/P ABDOMINOPLASTY: Status: ACTIVE | Noted: 2017-12-28

## 2021-02-26 PROBLEM — Z98.890 S/P BILATERAL BREAST REDUCTION: Status: ACTIVE | Noted: 2017-12-28

## 2021-02-26 PROBLEM — Z87.42 H/O ABNORMAL CERVICAL PAPANICOLAOU SMEAR: Status: ACTIVE | Noted: 2019-08-07

## 2021-02-26 LAB
BILIRUB BLD-MCNC: NEGATIVE MG/DL
CLARITY, POC: CLEAR
COLOR UR: YELLOW
GLUCOSE UR STRIP-MCNC: NEGATIVE MG/DL
KETONES UR QL: NEGATIVE
LEUKOCYTE EST, POC: NEGATIVE
NITRITE UR-MCNC: NEGATIVE MG/ML
PH UR: 6 [PH] (ref 5–8)
PROT UR STRIP-MCNC: NEGATIVE MG/DL
RBC # UR STRIP: ABNORMAL /UL
SP GR UR: 1.03 (ref 1–1.03)
UROBILINOGEN UR QL: NORMAL

## 2021-02-26 PROCEDURE — 99214 OFFICE O/P EST MOD 30 MIN: CPT | Performed by: FAMILY MEDICINE

## 2021-02-26 RX ORDER — CIPROFLOXACIN 500 MG/1
500 TABLET, FILM COATED ORAL 2 TIMES DAILY
Qty: 14 TABLET | Refills: 0 | Status: SHIPPED | OUTPATIENT
Start: 2021-02-26 | End: 2022-10-04

## 2021-02-26 NOTE — PROGRESS NOTES
Subjective   Elva Fields is a 40 y.o. female.     Chief Complaint   Patient presents with   • Urinary Tract Infection       History of Present Illness     Complaining on burning with urination bilateral flank pain feeling unwell, had history of urinary tract infections in the past  Planing of fatigue wants her thyroid checked.    The following portions of the patient's history were reviewed and updated as appropriate: allergies, current medications, past family history, past medical history, past social history, past surgical history and problem list.    No past medical history on file.    Past Surgical History:   Procedure Laterality Date   • ABDOMINOPLASTY     • BREAST AUGMENTATION         No family history on file.    Social History     Socioeconomic History   • Marital status: Single     Spouse name: Not on file   • Number of children: Not on file   • Years of education: Not on file   • Highest education level: Not on file   Tobacco Use   • Smoking status: Never Smoker   • Smokeless tobacco: Never Used   Substance and Sexual Activity   • Alcohol use: Yes     Frequency: Never     Comment: [email or web address]   • Drug use: No   • Sexual activity: Yes       Current Outpatient Medications on File Prior to Visit   Medication Sig Dispense Refill   • ibuprofen (ADVIL,MOTRIN) 800 MG tablet Take 1 tablet by mouth Every 8 (Eight) Hours As Needed for Mild Pain . 90 tablet 1     No current facility-administered medications on file prior to visit.        Review of Systems   Gastrointestinal: Positive for abdominal pain.   Musculoskeletal: Positive for back pain.       Recent Results (from the past 4704 hour(s))   COVID-19,[facility] ROUTINE, NP/OP SWAB IN TRANSPORT MEDIA OR ESWAB 72 HR TAT - Swab, Anterior nasal    Collection Time: 01/07/21  3:29 PM    Specimen: Anterior nasal; Swab   Result Value Ref Range    SARS-CoV-2, SHANEL Not Detected Not Detected   POCT urinalysis dipstick, automated    Collection Time: 02/26/21  7:45 AM     "Specimen: Urine   Result Value Ref Range    Color Yellow Yellow, Straw, Dark Yellow, Deborah    Clarity, UA Clear Clear    Specific Gravity  1.030 1.005 - 1.030    pH, Urine 6.0 5.0 - 8.0    Leukocytes Negative Negative    Nitrite, UA Negative Negative    Protein, POC Negative Negative mg/dL    Glucose, UA Negative Negative, 1000 mg/dL (3+) mg/dL    Ketones, UA Negative Negative    Urobilinogen, UA Normal Normal    Bilirubin Negative Negative    Blood, UA 2+ (A) Negative     Objective   Vitals:    02/26/21 0736   BP: 118/68   Pulse: 74   Temp: 97.1 °F (36.2 °C)   SpO2: 98%   Weight: 77.1 kg (170 lb)   Height: 162.6 cm (64.02\")     Body mass index is 29.17 kg/m².  Physical Exam  Vitals signs and nursing note reviewed.   Constitutional:       General: She is not in acute distress.     Appearance: She is well-developed. She is not diaphoretic.   Cardiovascular:      Rate and Rhythm: Normal rate and regular rhythm.   Pulmonary:      Effort: Pulmonary effort is normal. No respiratory distress.      Breath sounds: Normal breath sounds. No wheezing.   Abdominal:      Comments: S/P pain , cva B pain           Diagnoses and all orders for this visit:    1. Acute cystitis with hematuria (Primary)  Comments:  New diagnosis.  We will check urine culture.  Start Cipro  Orders:  -     Urine Culture - Urine, Urine, Clean Catch  -     ciprofloxacin (Cipro) 500 MG tablet; Take 1 tablet by mouth 2 (Two) Times a Day.  Dispense: 14 tablet; Refill: 0  -     Comprehensive Metabolic Panel  -     CBC & Differential  -     US Renal Bilateral; Future    2. Acute low back pain with sciatica, sciatica laterality unspecified, unspecified back pain laterality  -     POCT urinalysis dipstick, automated    3. Flank pain  Comments:  New complaint, new diagnosis  We will check a kidney ultrasound in view of her history of pyelonephritis and frequent urinary tract infections  Orders:  -     US Renal Bilateral; Future    4. Benign essential microscopic " hematuria  -     US Renal Bilateral; Future    5. Health maintenance examination  -     Lipid Panel With LDL / HDL Ratio    6. Chronic fatigue  -     TSH Rfx On Abnormal To Free T4      Return if symptoms worsen or fail to improve.

## 2021-02-28 LAB
ALBUMIN SERPL-MCNC: 4.2 G/DL (ref 3.5–5.2)
ALBUMIN/GLOB SERPL: 1.8 G/DL
ALP SERPL-CCNC: 67 U/L (ref 39–117)
ALT SERPL-CCNC: 14 U/L (ref 1–33)
AST SERPL-CCNC: 14 U/L (ref 1–32)
BACTERIA UR CULT: NORMAL
BACTERIA UR CULT: NORMAL
BASOPHILS # BLD AUTO: 0.03 10*3/MM3 (ref 0–0.2)
BASOPHILS NFR BLD AUTO: 0.4 % (ref 0–1.5)
BILIRUB SERPL-MCNC: 0.4 MG/DL (ref 0–1.2)
BUN SERPL-MCNC: 13 MG/DL (ref 6–20)
BUN/CREAT SERPL: 16.5 (ref 7–25)
CALCIUM SERPL-MCNC: 9 MG/DL (ref 8.6–10.5)
CHLORIDE SERPL-SCNC: 104 MMOL/L (ref 98–107)
CHOLEST SERPL-MCNC: 179 MG/DL (ref 0–200)
CO2 SERPL-SCNC: 26.6 MMOL/L (ref 22–29)
CREAT SERPL-MCNC: 0.79 MG/DL (ref 0.57–1)
EOSINOPHIL # BLD AUTO: 0.16 10*3/MM3 (ref 0–0.4)
EOSINOPHIL NFR BLD AUTO: 2.2 % (ref 0.3–6.2)
ERYTHROCYTE [DISTWIDTH] IN BLOOD BY AUTOMATED COUNT: 12.7 % (ref 12.3–15.4)
GLOBULIN SER CALC-MCNC: 2.3 GM/DL
GLUCOSE SERPL-MCNC: 96 MG/DL (ref 65–99)
HCT VFR BLD AUTO: 37.7 % (ref 34–46.6)
HDLC SERPL-MCNC: 55 MG/DL (ref 40–60)
HGB BLD-MCNC: 12.3 G/DL (ref 12–15.9)
IMM GRANULOCYTES # BLD AUTO: 0.04 10*3/MM3 (ref 0–0.05)
IMM GRANULOCYTES NFR BLD AUTO: 0.5 % (ref 0–0.5)
LDLC SERPL CALC-MCNC: 109 MG/DL (ref 0–100)
LDLC/HDLC SERPL: 1.96 {RATIO}
LYMPHOCYTES # BLD AUTO: 1.83 10*3/MM3 (ref 0.7–3.1)
LYMPHOCYTES NFR BLD AUTO: 24.9 % (ref 19.6–45.3)
MCH RBC QN AUTO: 27.5 PG (ref 26.6–33)
MCHC RBC AUTO-ENTMCNC: 32.6 G/DL (ref 31.5–35.7)
MCV RBC AUTO: 84.2 FL (ref 79–97)
MONOCYTES # BLD AUTO: 0.51 10*3/MM3 (ref 0.1–0.9)
MONOCYTES NFR BLD AUTO: 6.9 % (ref 5–12)
NEUTROPHILS # BLD AUTO: 4.78 10*3/MM3 (ref 1.7–7)
NEUTROPHILS NFR BLD AUTO: 65.1 % (ref 42.7–76)
NRBC BLD AUTO-RTO: 0 /100 WBC (ref 0–0.2)
PLATELET # BLD AUTO: 239 10*3/MM3 (ref 140–450)
POTASSIUM SERPL-SCNC: 4.6 MMOL/L (ref 3.5–5.2)
PROT SERPL-MCNC: 6.5 G/DL (ref 6–8.5)
RBC # BLD AUTO: 4.48 10*6/MM3 (ref 3.77–5.28)
SODIUM SERPL-SCNC: 138 MMOL/L (ref 136–145)
TRIGL SERPL-MCNC: 80 MG/DL (ref 0–150)
TSH SERPL DL<=0.005 MIU/L-ACNC: 3.1 UIU/ML (ref 0.27–4.2)
VLDLC SERPL CALC-MCNC: 15 MG/DL (ref 5–40)
WBC # BLD AUTO: 7.35 10*3/MM3 (ref 3.4–10.8)

## 2021-03-04 DIAGNOSIS — N30.01 ACUTE CYSTITIS WITH HEMATURIA: ICD-10-CM

## 2021-03-05 RX ORDER — CIPROFLOXACIN 500 MG/1
500 TABLET, FILM COATED ORAL 2 TIMES DAILY
Qty: 14 TABLET | Refills: 0 | OUTPATIENT
Start: 2021-03-05

## 2021-03-05 NOTE — TELEPHONE ENCOUNTER
Ok for hub to relay message:      Will not refill cipro. Patient should schedule a video visit for medication refill.

## 2021-03-17 ENCOUNTER — HOSPITAL ENCOUNTER (OUTPATIENT)
Dept: ULTRASOUND IMAGING | Facility: HOSPITAL | Age: 41
Discharge: HOME | End: 2021-03-17
Admitting: FAMILY MEDICINE

## 2021-03-17 DIAGNOSIS — R31.1 BENIGN ESSENTIAL MICROSCOPIC HEMATURIA: ICD-10-CM

## 2021-03-17 DIAGNOSIS — R10.9 FLANK PAIN: ICD-10-CM

## 2021-03-17 DIAGNOSIS — N30.01 ACUTE CYSTITIS WITH HEMATURIA: ICD-10-CM

## 2021-03-17 PROCEDURE — 76775 US EXAM ABDO BACK WALL LIM: CPT

## 2021-03-31 ENCOUNTER — BULK ORDERING (OUTPATIENT)
Dept: CASE MANAGEMENT | Facility: OTHER | Age: 41
End: 2021-03-31

## 2021-03-31 DIAGNOSIS — Z23 IMMUNIZATION DUE: ICD-10-CM

## 2022-10-04 ENCOUNTER — OFFICE VISIT (OUTPATIENT)
Dept: FAMILY MEDICINE CLINIC | Facility: CLINIC | Age: 42
End: 2022-10-04

## 2022-10-04 VITALS
BODY MASS INDEX: 27.66 KG/M2 | SYSTOLIC BLOOD PRESSURE: 133 MMHG | WEIGHT: 162 LBS | TEMPERATURE: 97.3 F | HEIGHT: 64 IN | DIASTOLIC BLOOD PRESSURE: 85 MMHG

## 2022-10-04 DIAGNOSIS — Z91.199 MEDICALLY NONCOMPLIANT: ICD-10-CM

## 2022-10-04 DIAGNOSIS — M54.12 CERVICAL RADICULOPATHY: ICD-10-CM

## 2022-10-04 DIAGNOSIS — E55.9 VITAMIN D DEFICIENCY: ICD-10-CM

## 2022-10-04 DIAGNOSIS — R53.83 OTHER FATIGUE: ICD-10-CM

## 2022-10-04 DIAGNOSIS — R20.0 NUMBNESS: ICD-10-CM

## 2022-10-04 DIAGNOSIS — R51.9 NEW ONSET OF HEADACHES: Primary | ICD-10-CM

## 2022-10-04 DIAGNOSIS — Z28.21 IMMUNIZATION REFUSED: ICD-10-CM

## 2022-10-04 DIAGNOSIS — M62.830 BACK MUSCLE SPASM: ICD-10-CM

## 2022-10-04 PROCEDURE — 99214 OFFICE O/P EST MOD 30 MIN: CPT | Performed by: FAMILY MEDICINE

## 2022-10-04 RX ORDER — BACLOFEN 10 MG/1
10 TABLET ORAL NIGHTLY
Qty: 30 TABLET | Refills: 1 | Status: SHIPPED | OUTPATIENT
Start: 2022-10-04 | End: 2022-10-31

## 2022-10-04 NOTE — PROGRESS NOTES
Subjective   Elva Fields is a 41 y.o. female.     Chief Complaint   Patient presents with   • Headache   • Numbness       History of Present Illness   New onset headaches, for 1 month, wakes up with a headache, no nausea or vomiting, no visual changes, no facial numbness or weakness  Arm numbness and occasional arms weakness  Lower extremity numbness in the thighs but no lower extremity weakness this  Neck pain, shoulder pain, cervical radiculopathy.      The following portions of the patient's history were reviewed and updated as appropriate: allergies, current medications, past family history, past medical history, past social history, past surgical history and problem list.    History reviewed. No pertinent past medical history.    Past Surgical History:   Procedure Laterality Date   • ABDOMINOPLASTY     • BREAST AUGMENTATION         History reviewed. No pertinent family history.    Social History     Socioeconomic History   • Marital status: Single   Tobacco Use   • Smoking status: Never Smoker   • Smokeless tobacco: Never Used   Substance and Sexual Activity   • Alcohol use: Yes     Comment: msocial   • Drug use: No   • Sexual activity: Yes       Current Outpatient Medications on File Prior to Visit   Medication Sig Dispense Refill   • ibuprofen (ADVIL,MOTRIN) 800 MG tablet Take 1 tablet by mouth Every 8 (Eight) Hours As Needed for Mild Pain . 90 tablet 1   • [DISCONTINUED] ciprofloxacin (Cipro) 500 MG tablet Take 1 tablet by mouth 2 (Two) Times a Day. 14 tablet 0     No current facility-administered medications on file prior to visit.       Review of Systems   Musculoskeletal:        Muscle spasms   Neurological: Positive for numbness and headache.       Recent Results (from the past 4704 hour(s))   CBC AND DIFFERENTIAL    Collection Time: 04/05/22 12:44 PM    Specimen type and source: Whole Blood, Blood   Result Value Ref Range    WBC 7.43 4.5 - 11.0 10*3/uL    RBC 4.20 4.0 - 5.2 10*6/uL    Hemoglobin  11.5 (L) 12.0 - 16.0 g/dL    Hematocrit 35.4 (L) 36.0 - 46.0 %    MCV 84.3 80.0 - 100.0 fL    MCH 27.4 26.0 - 34.0 pg    MCHC 32.5 31.0 - 37.0 g/dL    RDW 13.4 12.0 - 16.8 %    Platelets 257 140 - 440 10*3/uL    MPV 10.1 8.4 - 12.4 fL    Differential Type [facility] CBC w/AutoDiff (arb'U) (arb'U)    Neutrophil Rel % 67.9 45 - 80 %    Lymphocyte Rel % 23.4 15 - 50 %    Monocyte Rel % 6.6 0 - 15 %    Eosinophil % 1.3 0 - 7 %    Basophil Rel % 0.5 0 - 2 %    Immature Grans % 0.3 0.0 - 1.0 %    nRBC 0 0 /100(WBC)    Neutrophils Absolute 5.04 2.0 - 8.8 10*3/uL    Lymphocytes Absolute 1.74 0.7 - 5.5 10*3/uL    Monocytes Absolute 0.49 0.0 - 1.7 10*3/uL    Eosinophils Absolute 0.10 0.0 - 0.8 10*3/uL    Basophils Absolute 0.04 0.0 - 0.2 10*3/uL    Immature Grans, Absolute 0.02 0.00 - 0.10 10*3/uL   TYPE AND SCREEN    Collection Time: 04/05/22 12:44 PM    Specimen type and source: Whole Blood, Blood   Result Value Ref Range    ABORh B Negative     Antibody Screen Negative     Crossmatch Expiration 04/08/2022,2358    HCG, SERUM, QUALITATIVE    Collection Time: 04/11/22  9:37 AM    Specimen type and source: Serum, Blood   Result Value Ref Range    HCG Qualitative Negative Negative   CBC AND DIFFERENTIAL    Collection Time: 04/11/22  9:37 AM    Specimen type and source: Whole Blood, Blood   Result Value Ref Range    WBC 6.18 4.5 - 11.0 10*3/uL    RBC 4.36 4.0 - 5.2 10*6/uL    Hemoglobin 11.6 (L) 12.0 - 16.0 g/dL    Hematocrit 36.3 36.0 - 46.0 %    MCV 83.3 80.0 - 100.0 fL    MCH 26.6 26.0 - 34.0 pg    MCHC 32.0 31.0 - 37.0 g/dL    RDW 13.0 12.0 - 16.8 %    Platelets 262 140 - 440 10*3/uL    MPV 9.6 8.4 - 12.4 fL    Differential Type [facility] CBC w/AutoDiff (arb'U) (arb'U)    Neutrophil Rel % 64.9 45 - 80 %    Lymphocyte Rel % 24.6 15 - 50 %    Monocyte Rel % 7.6 0 - 15 %    Eosinophil % 1.8 0 - 7 %    Basophil Rel % 0.6 0 - 2 %    Immature Grans % 0.5 0.0 - 1.0 %    nRBC 0 0 /100(WBC)    Neutrophils Absolute 4.01 2.0 - 8.8 10*3/uL  "   Lymphocytes Absolute 1.52 0.7 - 5.5 10*3/uL    Monocytes Absolute 0.47 0.0 - 1.7 10*3/uL    Eosinophils Absolute 0.11 0.0 - 0.8 10*3/uL    Basophils Absolute 0.04 0.0 - 0.2 10*3/uL    Immature Grans, Absolute 0.03 0.00 - 0.10 10*3/uL   TYPE AND SCREEN    Collection Time: 04/11/22  9:51 AM    Specimen type and source: Whole Blood, Blood   Result Value Ref Range    ABORh B Negative     Antibody Screen Negative     Crossmatch Expiration 04/14/2022,2359      Objective   Vitals:    10/04/22 1513   BP: 133/85   BP Location: Left arm   Patient Position: Sitting   Temp: 97.3 °F (36.3 °C)   Weight: 73.5 kg (162 lb)   Height: 162.6 cm (64.02\")     Body mass index is 27.79 kg/m².  Physical Exam  Vitals and nursing note reviewed.   Constitutional:       General: She is not in acute distress.     Appearance: She is well-developed. She is not diaphoretic.   Cardiovascular:      Rate and Rhythm: Normal rate and regular rhythm.   Pulmonary:      Effort: Pulmonary effort is normal. No respiratory distress.      Breath sounds: Normal breath sounds. No wheezing.   Neurological:      General: No focal deficit present.      Mental Status: She is oriented to person, place, and time.      Cranial Nerves: No cranial nerve deficit.      Sensory: Sensory deficit present.      Motor: Weakness present.      Coordination: Coordination normal.      Gait: Gait normal.      Deep Tendon Reflexes: Reflexes normal.      Comments: Bilateral upper and lower extremity dull sensation on pinprick test.  Normal cranial nerves including facial nerve.  She does have slight weakness on the upper extremity with a .  No weakness in the bilateral lower extremity.     Psychiatric:         Mood and Affect: Mood normal.           Diagnoses and all orders for this visit:    1. New onset of headaches (Primary)  -     MRI Cervical Spine Without Contrast; Future  -     MRI Brain Without Contrast; Future    2. Immunization refused    3. Medically " noncompliant    4. Other fatigue  -     Magnesium  -     Phosphorus  -     Comprehensive Metabolic Panel  -     Lipid Panel  -     CBC & Differential  -     Vitamin B12 & Folate  -     TSH  -     Vitamin D 25 Hydroxy  -     Vitamin B6    5. Numbness  -     Magnesium  -     Phosphorus  -     Comprehensive Metabolic Panel  -     Lipid Panel  -     CBC & Differential  -     Vitamin B12 & Folate  -     TSH  -     Vitamin D 25 Hydroxy  -     Vitamin B6  -     baclofen (LIORESAL) 10 MG tablet; Take 1 tablet by mouth Every Night.  Dispense: 30 tablet; Refill: 1    6. Vitamin D deficiency  -     Vitamin D 25 Hydroxy    7. Cervical radiculopathy  -     MRI Cervical Spine Without Contrast; Future  -     MRI Brain Without Contrast; Future  -     baclofen (LIORESAL) 10 MG tablet; Take 1 tablet by mouth Every Night.  Dispense: 30 tablet; Refill: 1    8. Back muscle spasm  -     baclofen (LIORESAL) 10 MG tablet; Take 1 tablet by mouth Every Night.  Dispense: 30 tablet; Refill: 1      Return if symptoms worsen or fail to improve.

## 2022-10-06 LAB
25(OH)D3+25(OH)D2 SERPL-MCNC: 27.6 NG/ML (ref 30–100)
ALBUMIN SERPL-MCNC: 4.8 G/DL (ref 3.5–5.2)
ALBUMIN/GLOB SERPL: 2.5 G/DL
ALP SERPL-CCNC: 60 U/L (ref 39–117)
ALT SERPL-CCNC: 15 U/L (ref 1–33)
AST SERPL-CCNC: 13 U/L (ref 1–32)
BASOPHILS # BLD AUTO: 0.04 10*3/MM3 (ref 0–0.2)
BASOPHILS NFR BLD AUTO: 0.5 % (ref 0–1.5)
BILIRUB SERPL-MCNC: 0.2 MG/DL (ref 0–1.2)
BUN SERPL-MCNC: 10 MG/DL (ref 6–20)
BUN/CREAT SERPL: 13.7 (ref 7–25)
CALCIUM SERPL-MCNC: 9.5 MG/DL (ref 8.6–10.5)
CHLORIDE SERPL-SCNC: 101 MMOL/L (ref 98–107)
CHOLEST SERPL-MCNC: 195 MG/DL (ref 0–200)
CO2 SERPL-SCNC: 27.7 MMOL/L (ref 22–29)
CREAT SERPL-MCNC: 0.73 MG/DL (ref 0.57–1)
EGFRCR SERPLBLD CKD-EPI 2021: 106.1 ML/MIN/1.73
EOSINOPHIL # BLD AUTO: 0.18 10*3/MM3 (ref 0–0.4)
EOSINOPHIL NFR BLD AUTO: 2.1 % (ref 0.3–6.2)
ERYTHROCYTE [DISTWIDTH] IN BLOOD BY AUTOMATED COUNT: 13 % (ref 12.3–15.4)
FOLATE SERPL-MCNC: 7.7 NG/ML (ref 4.78–24.2)
GLOBULIN SER CALC-MCNC: 1.9 GM/DL
GLUCOSE SERPL-MCNC: 101 MG/DL (ref 65–99)
HCT VFR BLD AUTO: 34.4 % (ref 34–46.6)
HDLC SERPL-MCNC: 61 MG/DL (ref 40–60)
HGB BLD-MCNC: 11.4 G/DL (ref 12–15.9)
IMM GRANULOCYTES # BLD AUTO: 0.03 10*3/MM3 (ref 0–0.05)
IMM GRANULOCYTES NFR BLD AUTO: 0.3 % (ref 0–0.5)
LDLC SERPL CALC-MCNC: 109 MG/DL (ref 0–100)
LYMPHOCYTES # BLD AUTO: 1.89 10*3/MM3 (ref 0.7–3.1)
LYMPHOCYTES NFR BLD AUTO: 21.7 % (ref 19.6–45.3)
MAGNESIUM SERPL-MCNC: 2.1 MG/DL (ref 1.6–2.6)
MCH RBC QN AUTO: 25.8 PG (ref 26.6–33)
MCHC RBC AUTO-ENTMCNC: 33.1 G/DL (ref 31.5–35.7)
MCV RBC AUTO: 77.8 FL (ref 79–97)
MONOCYTES # BLD AUTO: 0.46 10*3/MM3 (ref 0.1–0.9)
MONOCYTES NFR BLD AUTO: 5.3 % (ref 5–12)
NEUTROPHILS # BLD AUTO: 6.11 10*3/MM3 (ref 1.7–7)
NEUTROPHILS NFR BLD AUTO: 70.1 % (ref 42.7–76)
NRBC BLD AUTO-RTO: 0 /100 WBC (ref 0–0.2)
PHOSPHATE SERPL-MCNC: 3.8 MG/DL (ref 2.5–4.5)
PLATELET # BLD AUTO: 261 10*3/MM3 (ref 140–450)
POTASSIUM SERPL-SCNC: 4.1 MMOL/L (ref 3.5–5.2)
PROT SERPL-MCNC: 6.7 G/DL (ref 6–8.5)
RBC # BLD AUTO: 4.42 10*6/MM3 (ref 3.77–5.28)
SODIUM SERPL-SCNC: 140 MMOL/L (ref 136–145)
TRIGL SERPL-MCNC: 142 MG/DL (ref 0–150)
TSH SERPL DL<=0.005 MIU/L-ACNC: 1.12 UIU/ML (ref 0.27–4.2)
VIT B12 SERPL-MCNC: 420 PG/ML (ref 211–946)
VIT B6 SERPL-MCNC: 16.1 UG/L (ref 3.4–65.2)
VLDLC SERPL CALC-MCNC: 25 MG/DL (ref 5–40)
WBC # BLD AUTO: 8.71 10*3/MM3 (ref 3.4–10.8)

## 2022-10-08 ENCOUNTER — HOSPITAL ENCOUNTER (OUTPATIENT)
Dept: MRI IMAGING | Facility: HOSPITAL | Age: 42
Discharge: HOME | End: 2022-10-08
Admitting: FAMILY MEDICINE

## 2022-10-08 DIAGNOSIS — R51.9 NEW ONSET OF HEADACHES: ICD-10-CM

## 2022-10-08 DIAGNOSIS — M54.12 CERVICAL RADICULOPATHY: ICD-10-CM

## 2022-10-08 PROCEDURE — 72141 MRI NECK SPINE W/O DYE: CPT

## 2022-10-10 NOTE — PROGRESS NOTES
Called patient regarding her  MRI results, explained that she has degenerative disc disease C6-C7 as well as osteophyte formation.  She was given a choice of physical therapy pain management with injection massage and conservative treatment at this time.  Patient will let me know if she wants to go to physical

## 2022-10-29 DIAGNOSIS — M54.12 CERVICAL RADICULOPATHY: ICD-10-CM

## 2022-10-29 DIAGNOSIS — M62.830 BACK MUSCLE SPASM: ICD-10-CM

## 2022-10-29 DIAGNOSIS — R20.0 NUMBNESS: ICD-10-CM

## 2022-10-31 ENCOUNTER — APPOINTMENT (OUTPATIENT)
Dept: MRI IMAGING | Facility: HOSPITAL | Age: 42
End: 2022-10-31

## 2022-10-31 RX ORDER — BACLOFEN 10 MG/1
TABLET ORAL
Qty: 30 TABLET | Refills: 1 | Status: SHIPPED | OUTPATIENT
Start: 2022-10-31 | End: 2022-11-17 | Stop reason: SDUPTHER

## 2022-11-17 DIAGNOSIS — R20.0 NUMBNESS: ICD-10-CM

## 2022-11-17 DIAGNOSIS — M62.830 BACK MUSCLE SPASM: ICD-10-CM

## 2022-11-17 DIAGNOSIS — M54.12 CERVICAL RADICULOPATHY: ICD-10-CM

## 2022-11-17 RX ORDER — BACLOFEN 10 MG/1
10 TABLET ORAL
Qty: 30 TABLET | Refills: 1 | Status: SHIPPED | OUTPATIENT
Start: 2022-11-17

## 2023-05-04 ENCOUNTER — OFFICE VISIT (OUTPATIENT)
Dept: FAMILY MEDICINE CLINIC | Facility: CLINIC | Age: 43
End: 2023-05-04
Payer: COMMERCIAL

## 2023-05-04 VITALS
HEART RATE: 82 BPM | RESPIRATION RATE: 16 BRPM | TEMPERATURE: 98.9 F | HEIGHT: 64 IN | OXYGEN SATURATION: 100 % | BODY MASS INDEX: 27.9 KG/M2 | DIASTOLIC BLOOD PRESSURE: 72 MMHG | WEIGHT: 163.4 LBS | SYSTOLIC BLOOD PRESSURE: 112 MMHG

## 2023-05-04 DIAGNOSIS — D64.9 ANEMIA, UNSPECIFIED TYPE: Primary | ICD-10-CM

## 2023-05-04 PROBLEM — N80.9 ENDOMETRIOSIS: Status: ACTIVE | Noted: 2022-04-11

## 2023-05-04 PROBLEM — R10.31 RIGHT LOWER QUADRANT PAIN: Status: ACTIVE | Noted: 2022-04-05

## 2023-05-04 PROBLEM — Z00.00 ROUTINE HEALTH MAINTENANCE: Status: ACTIVE | Noted: 2021-12-10

## 2023-05-04 PROBLEM — K76.0 FATTY (CHANGE OF) LIVER, NOT ELSEWHERE CLASSIFIED: Status: ACTIVE | Noted: 2022-02-02

## 2023-05-04 PROBLEM — Z98.51 S/P TUBAL LIGATION: Status: ACTIVE | Noted: 2022-04-29

## 2023-05-04 PROBLEM — N70.11 CHRONIC SALPINGITIS: Status: ACTIVE | Noted: 2022-04-11

## 2023-05-04 NOTE — PROGRESS NOTES
"Chief Complaint  Establish Care and Anemia    Subjective          History of Present Illness    Elva Fields 42 y.o. female presents today for a new patient appointment.  She is here to establish care and is a new patient to me. I reviewed the PFSH recorded today by my MA/LPN staff.       The patient feels well overall.  She has a history of anemia.  CBC was drawn in December 2022 and revealed low hemoglobin at 11.0 and low hematocrit at 34.5.  She is not currently supplementing with iron.  Will check labs today.  She is asymptomatic today.        Objective   Vital Signs:   /72 (BP Location: Left arm, Patient Position: Sitting, Cuff Size: Adult)   Pulse 82   Temp 98.9 °F (37.2 °C) (Oral)   Resp 16   Ht 162.6 cm (64.02\")   Wt 74.1 kg (163 lb 6.4 oz)   SpO2 100%   BMI 28.03 kg/m²      BMI is >= 25 and <30. (Overweight) The following options were offered after discussion;: exercise counseling/recommendations and nutrition counseling/recommendations        Physical Exam  Vitals and nursing note reviewed.   Constitutional:       Appearance: She is well-developed and overweight. She is not toxic-appearing.   HENT:      Head: Normocephalic.      Right Ear: External ear normal.      Left Ear: External ear normal.   Eyes:      General: No scleral icterus.     Pupils: Pupils are equal, round, and reactive to light.   Neck:      Thyroid: No thyromegaly.      Vascular: No carotid bruit.   Cardiovascular:      Rate and Rhythm: Normal rate and regular rhythm.      Pulses: Normal pulses.      Heart sounds: Normal heart sounds.   Pulmonary:      Effort: Pulmonary effort is normal. No respiratory distress.      Breath sounds: Normal breath sounds. No stridor.   Musculoskeletal:         General: No deformity.      Right lower leg: No edema.      Left lower leg: No edema.   Skin:     General: Skin is warm.      Coloration: Skin is not jaundiced.   Neurological:      General: No focal deficit present.      Mental " Status: She is alert and oriented to person, place, and time.      Motor: No weakness.   Psychiatric:         Behavior: Behavior normal.         Thought Content: Thought content normal.         Judgment: Judgment normal.          The following data was reviewed by: SHAYY Peñaloza on 05/04/2023:  CBC AND DIFFERENTIAL (12/19/2022 10:28)               Assessment and Plan      Diagnoses and all orders for this visit:    1. Anemia, unspecified type (Primary)  Comments:  New patient, asymptomatic  Labs today  Increase iron-rich foods  Follow-up as needed    Orders:  -     Comprehensive metabolic panel  -     Lipid panel  -     CBC and Differential  -     TSH            Follow Up     Return as needed.    Patient was given instructions and counseling regarding her condition or for health maintenance advice. Please see specific information pulled into the AVS if appropriate.     -Follow up as needed.

## 2023-05-06 LAB
ALBUMIN SERPL-MCNC: 4.2 G/DL (ref 3.5–5.2)
ALBUMIN/GLOB SERPL: 2 G/DL
ALP SERPL-CCNC: 73 U/L (ref 39–117)
ALT SERPL-CCNC: 14 U/L (ref 1–33)
AST SERPL-CCNC: 13 U/L (ref 1–32)
BASOPHILS # BLD AUTO: 0.03 10*3/MM3 (ref 0–0.2)
BASOPHILS NFR BLD AUTO: 0.5 % (ref 0–1.5)
BILIRUB SERPL-MCNC: 0.4 MG/DL (ref 0–1.2)
BUN SERPL-MCNC: 11 MG/DL (ref 6–20)
BUN/CREAT SERPL: 16.4 (ref 7–25)
CALCIUM SERPL-MCNC: 9.1 MG/DL (ref 8.6–10.5)
CHLORIDE SERPL-SCNC: 104 MMOL/L (ref 98–107)
CHOLEST SERPL-MCNC: 182 MG/DL (ref 0–200)
CO2 SERPL-SCNC: 25.3 MMOL/L (ref 22–29)
CREAT SERPL-MCNC: 0.67 MG/DL (ref 0.57–1)
EGFRCR SERPLBLD CKD-EPI 2021: 112.1 ML/MIN/1.73
EOSINOPHIL # BLD AUTO: 0.13 10*3/MM3 (ref 0–0.4)
EOSINOPHIL NFR BLD AUTO: 2 % (ref 0.3–6.2)
ERYTHROCYTE [DISTWIDTH] IN BLOOD BY AUTOMATED COUNT: 13.5 % (ref 12.3–15.4)
GLOBULIN SER CALC-MCNC: 2.1 GM/DL
GLUCOSE SERPL-MCNC: 101 MG/DL (ref 65–99)
HCT VFR BLD AUTO: 32.5 % (ref 34–46.6)
HDLC SERPL-MCNC: 58 MG/DL (ref 40–60)
HGB BLD-MCNC: 10.2 G/DL (ref 12–15.9)
IMM GRANULOCYTES # BLD AUTO: 0.02 10*3/MM3 (ref 0–0.05)
IMM GRANULOCYTES NFR BLD AUTO: 0.3 % (ref 0–0.5)
LDLC SERPL CALC-MCNC: 110 MG/DL (ref 0–100)
LYMPHOCYTES # BLD AUTO: 1.55 10*3/MM3 (ref 0.7–3.1)
LYMPHOCYTES NFR BLD AUTO: 24.2 % (ref 19.6–45.3)
MCH RBC QN AUTO: 24.1 PG (ref 26.6–33)
MCHC RBC AUTO-ENTMCNC: 31.4 G/DL (ref 31.5–35.7)
MCV RBC AUTO: 76.7 FL (ref 79–97)
MONOCYTES # BLD AUTO: 0.42 10*3/MM3 (ref 0.1–0.9)
MONOCYTES NFR BLD AUTO: 6.6 % (ref 5–12)
NEUTROPHILS # BLD AUTO: 4.25 10*3/MM3 (ref 1.7–7)
NEUTROPHILS NFR BLD AUTO: 66.4 % (ref 42.7–76)
NRBC BLD AUTO-RTO: 0.2 /100 WBC (ref 0–0.2)
PLATELET # BLD AUTO: 275 10*3/MM3 (ref 140–450)
POTASSIUM SERPL-SCNC: 4.5 MMOL/L (ref 3.5–5.2)
PROT SERPL-MCNC: 6.3 G/DL (ref 6–8.5)
RBC # BLD AUTO: 4.24 10*6/MM3 (ref 3.77–5.28)
SODIUM SERPL-SCNC: 140 MMOL/L (ref 136–145)
TRIGL SERPL-MCNC: 73 MG/DL (ref 0–150)
TSH SERPL DL<=0.005 MIU/L-ACNC: 2.15 UIU/ML (ref 0.27–4.2)
VLDLC SERPL CALC-MCNC: 14 MG/DL (ref 5–40)
WBC # BLD AUTO: 6.4 10*3/MM3 (ref 3.4–10.8)

## 2023-05-10 DIAGNOSIS — D64.9 ANEMIA, UNSPECIFIED TYPE: Primary | ICD-10-CM

## 2023-05-10 RX ORDER — FERROUS SULFATE 325(65) MG
TABLET ORAL
Qty: 30 TABLET | Refills: 5 | Status: SHIPPED | OUTPATIENT
Start: 2023-05-10

## 2024-09-02 ENCOUNTER — HOSPITAL ENCOUNTER (OUTPATIENT)
Facility: HOSPITAL | Age: 44
Discharge: HOME | End: 2024-09-02
Attending: EMERGENCY MEDICINE | Admitting: EMERGENCY MEDICINE
Payer: COMMERCIAL

## 2024-09-02 ENCOUNTER — APPOINTMENT (OUTPATIENT)
Dept: GENERAL RADIOLOGY | Facility: HOSPITAL | Age: 44
End: 2024-09-02
Payer: COMMERCIAL

## 2024-09-02 VITALS
HEIGHT: 64 IN | DIASTOLIC BLOOD PRESSURE: 59 MMHG | RESPIRATION RATE: 15 BRPM | HEART RATE: 75 BPM | WEIGHT: 160 LBS | OXYGEN SATURATION: 99 % | BODY MASS INDEX: 27.31 KG/M2 | TEMPERATURE: 97.7 F | SYSTOLIC BLOOD PRESSURE: 115 MMHG

## 2024-09-02 DIAGNOSIS — J20.9 ACUTE BRONCHITIS, UNSPECIFIED ORGANISM: Primary | ICD-10-CM

## 2024-09-02 LAB
FLUAV SUBTYP SPEC NAA+PROBE: NOT DETECTED
FLUBV RNA ISLT QL NAA+PROBE: NOT DETECTED
SARS-COV-2 RNA RESP QL NAA+PROBE: NOT DETECTED
STREP A PCR: NOT DETECTED

## 2024-09-02 PROCEDURE — 71045 X-RAY EXAM CHEST 1 VIEW: CPT

## 2024-09-02 PROCEDURE — G0463 HOSPITAL OUTPT CLINIC VISIT: HCPCS | Performed by: PHYSICIAN ASSISTANT

## 2024-09-02 PROCEDURE — 87636 SARSCOV2 & INF A&B AMP PRB: CPT

## 2024-09-02 PROCEDURE — 87651 STREP A DNA AMP PROBE: CPT

## 2024-09-02 PROCEDURE — 99204 OFFICE O/P NEW MOD 45 MIN: CPT | Performed by: PHYSICIAN ASSISTANT

## 2024-09-02 RX ORDER — ALBUTEROL SULFATE 90 UG/1
2 AEROSOL, METERED RESPIRATORY (INHALATION) EVERY 4 HOURS PRN
Qty: 18 G | Refills: 0 | Status: SHIPPED | OUTPATIENT
Start: 2024-09-02

## 2024-09-02 RX ORDER — BENZONATATE 200 MG/1
200 CAPSULE ORAL 3 TIMES DAILY PRN
Qty: 21 CAPSULE | Refills: 0 | Status: SHIPPED | OUTPATIENT
Start: 2024-09-02

## 2024-09-02 RX ORDER — AZITHROMYCIN 250 MG/1
TABLET, FILM COATED ORAL
Qty: 6 TABLET | Refills: 0 | Status: SHIPPED | OUTPATIENT
Start: 2024-09-02

## 2024-09-02 NOTE — FSED PROVIDER NOTE
EMERGENCY DEPARTMENT ENCOUNTER    Room Number:  08/08  Date seen:  9/2/2024  Time seen: 12:23 EDT  PCP: Clementine Jones APRN  Historian: Patient    Discussed/obtained information from independent historians: N/A    HPI:  Chief complaint: Cough, congestion, sore throat  A complete HPI/ROS/PMH/PSH/SH/FH are unobtainable due to: Nothing  Context:Elva Fields is a 43 y.o. female significant past medical history of endometriosis, BTL, chronic fatigue, elevated BMI who presents to the ED with c/o cough congestion sore throat.  Patient reports symptoms began roughly a week ago.  She states she initially got better and the cough is worsened.  Cough is said to be deep and productive with a dark green sputum.  Patient has had chills but no fever.  No abdominal pain, nausea, vomiting, shortness of breath.  No chest pain.  Patient does report recent trip to [facility] prior to the onset of symptoms.     reported to have the same symptoms.    Chronic or social conditions impacting care:    ALLERGIES  Patient has no known allergies.    PAST MEDICAL HISTORY  Active Ambulatory Problems     Diagnosis Date Noted    Chronic left shoulder pain 08/02/2019    Epigastric pain 08/02/2019    Urinary tract infection with hematuria 01/29/2020    Frequency of urination 01/29/2020    Acute cystitis with hematuria 01/25/2017    H/O abnormal cervical Papanicolaou smear 08/07/2019    IUD (intrauterine device) in place 06/13/2014    LGSIL (low grade squamous intraepithelial dysplasia) 09/19/2012    S/P abdominoplasty 12/28/2017    S/P bilateral breast reduction 12/28/2017    Yeast infection of the vagina 12/28/2017    Acute low back pain with sciatica 02/26/2021    Flank pain 02/26/2021    Benign essential microscopic hematuria 02/26/2021    Health maintenance examination 02/26/2021    Chronic fatigue 02/26/2021    Body mass index (BMI) of 26.0-26.9 in adult 12/10/2021    Chronic salpingitis 04/11/2022    Endometriosis 04/11/2022     Fatty (change of) liver, not elsewhere classified 02/02/2022    Right lower quadrant pain 04/05/2022    Routine health maintenance 12/10/2021    S/P tubal ligation 04/29/2022     Resolved Ambulatory Problems     Diagnosis Date Noted    No Resolved Ambulatory Problems     No Additional Past Medical History       PAST SURGICAL HISTORY  Past Surgical History:   Procedure Laterality Date    ABDOMINOPLASTY      BREAST AUGMENTATION         FAMILY HISTORY  History reviewed. No pertinent family history.    SOCIAL HISTORY  Social History     Socioeconomic History    Marital status: Single   Tobacco Use    Smoking status: Never    Smokeless tobacco: Never   Substance and Sexual Activity    Alcohol use: Yes     Comment: msocial    Drug use: No    Sexual activity: Yes       REVIEW OF SYSTEMS  Review of Systems    All systems reviewed and negative except for those discussed in HPI.     PHYSICAL EXAM    I have reviewed the triage vital signs and nursing notes.  Vitals:    09/02/24 1136   BP: 115/59   Pulse: 75   Resp: 15   Temp: 97.7 °F (36.5 °C)   SpO2: 99%     Physical Exam    GENERAL: WDWN female, not distressed  HENT: nares patent  EYES: no scleral icterus  NECK: no ROM limitations  CV: regular rhythm, regular rate, no unilateral leg swelling or pedal edema.  RESPIRATORY: Rhonchi right lower lobe.  ABDOMEN: soft  : deferred  MUSCULOSKELETAL: no deformity  NEURO: alert, moves all extremities, follows commands  SKIN: warm, dry    LAB RESULTS  Recent Results (from the past 24 hour(s))   Rapid Strep A Screen - Swab, Throat    Collection Time: 09/02/24 11:33 AM    Specimen: Throat; Swab   Result Value Ref Range    STREP A PCR Not Detected Not Detected   COVID-19 and FLU A/B PCR, 1 HR TAT - Swab, Nasopharynx    Collection Time: 09/02/24 11:33 AM    Specimen: Nasopharynx; Swab   Result Value Ref Range    COVID19 Not Detected Not Detected - Ref. Range    Influenza A PCR Not Detected Not Detected    Influenza B PCR Not Detected  Not Detected       Ordered the above labs and independently interpreted results.  My findings will be discussed in the ED course or medical decision making section below    RADIOLOGY RESULTS  XR Chest 1 View    Result Date: 9/2/2024  CHEST SINGLE VIEW  HISTORY: 43 years of age, Female.  R/o PNA  COMPARISON: None.  FINDINGS: Cardiomediastinal silhouette is normal. Lungs are clear and there is no evidence for pulmonary edema or pleural effusion or infiltrate.      No evidence for active disease in the chest.  This report was finalized on 9/2/2024 1:18 PM by Yann Julien M.D on Workstation: [id number]        Ordered the above noted radiological studies.  Independently interpreted by me.  My findings will be discussed in the medical decision section below.     PROGRESS, DATA ANALYSIS, CONSULTS AND MEDICAL DECISION MAKING    Please note that this section constitutes my independent interpretation of clinical data including lab results, radiology, EKG's.  This constitutes my independent professional opinion regarding differential diagnosis and management of this patient.  It may include any factors such as history from outside sources, review of external records, social determinants of health, management of medications, response to those treatments, and discussions with other providers.    ED Course as of 09/02/24 1749   Mon Sep 02, 2024   1324 IMPRESSION:  No evidence for active disease in the chest.     This report was finalized on 9/2/2024 1:18 PM by Yann Julien M.D on  Workstation: [id number]   [RC]   1324 I question infiltrate in the vicinity of the right lower lobe.  Will go ahead and diagnoses an acute bronchitis and treat with a Z-Trenton. [RC]      ED Course User Index  [RC] Diomedes Henderson III, PA     Orders placed during this visit:  Orders Placed This Encounter   Procedures    Rapid Strep A Screen - Swab, Throat    COVID-19 and FLU A/B PCR, 1 HR TAT - Swab, Nasopharynx    XR Chest 1 View             Medical Decision Making  Problems Addressed:  Acute bronchitis, unspecified organism: complicated acute illness or injury    Amount and/or Complexity of Data Reviewed  Radiology: ordered.    Risk  Prescription drug management.    COVID, flu, strep, PNA.  Patient checked for strep COVID and flu in triage and all negative.  Will obtain chest x-ray to rule out PNA.        DIAGNOSIS  Final diagnoses:   Acute bronchitis, unspecified organism          Medication List        New Prescriptions      albuterol sulfate  (90 Base) MCG/ACT inhaler  Commonly known as: PROVENTIL HFA;VENTOLIN HFA;PROAIR HFA  Inhale 2 puffs Every 4 (Four) Hours As Needed for Shortness of Air or Wheezing.     azithromycin 250 MG tablet  Commonly known as: Zithromax Z-Trenton  Take 2 tablets by mouth on day 1, then 1 tablet daily on days 2-5     benzonatate 200 MG capsule  Commonly known as: TESSALON  Take 1 capsule by mouth 3 (Three) Times a Day As Needed for Cough.               Where to Get Your Medications        These medications were sent to [facility]/pharmacy #[id number] - [place], [place] - [address] - [phone]  - [phone] [place] [id number]      Phone: [phone]   albuterol sulfate  (90 Base) MCG/ACT inhaler  azithromycin 250 MG tablet  benzonatate 200 MG capsule         FOLLOW-UP  ViaClementine APRN  [address]  [facility] [id number] [phone]    In 1 week  For further evaluation and treatment, As needed        Latest Documented Vital Signs:  As of 17:49 EDT  BP- 115/59 HR- 75 Temp- 97.7 °F (36.5 °C) (Tympanic) O2 sat- 99%    Appropriate PPE utilized throughout this patient encounter to include mask, if indicated, per current protocol. Hand hygiene was performed before donning PPE and after removal when leaving the room.    Please note that portions of this were completed with a voice recognition program.     Note Disclaimer: At [facility], we believe  that sharing information builds trust and better relationships. You are receiving this note because you are receiving care at [facility] or recently visited. It is possible you will see health information before a provider has talked with you about it. This kind of information can be easy to misunderstand. To help you fully understand what it means for your health, we urge you to discuss this note with your provider.

## 2024-09-02 NOTE — ED NOTES
Pt presents to facility with reports of cough, congestion, sore throat, recent trip to [facility].    Juan Jose Shea RN

## 2024-09-02 NOTE — ED NOTES
Pt presents to facility with reports of cough and nasal congestion recent trip to [facility].    Juan Jose Shea RN

## 2024-10-29 ENCOUNTER — OFFICE VISIT (OUTPATIENT)
Dept: FAMILY MEDICINE CLINIC | Facility: CLINIC | Age: 44
End: 2024-10-29
Payer: COMMERCIAL

## 2024-10-29 VITALS
HEART RATE: 74 BPM | SYSTOLIC BLOOD PRESSURE: 116 MMHG | TEMPERATURE: 98 F | OXYGEN SATURATION: 97 % | WEIGHT: 169.4 LBS | HEIGHT: 64 IN | BODY MASS INDEX: 28.92 KG/M2 | DIASTOLIC BLOOD PRESSURE: 66 MMHG

## 2024-10-29 DIAGNOSIS — L50.0 URTICARIA DUE TO FOOD ALLERGY: ICD-10-CM

## 2024-10-29 DIAGNOSIS — K59.09 CHRONIC CONSTIPATION: ICD-10-CM

## 2024-10-29 DIAGNOSIS — L23.9 ALLERGIC DERMATITIS: Primary | ICD-10-CM

## 2024-10-29 DIAGNOSIS — R73.01 IFG (IMPAIRED FASTING GLUCOSE): ICD-10-CM

## 2024-10-29 PROCEDURE — 1159F MED LIST DOCD IN RCRD: CPT

## 2024-10-29 PROCEDURE — 99214 OFFICE O/P EST MOD 30 MIN: CPT

## 2024-10-29 PROCEDURE — 1126F AMNT PAIN NOTED NONE PRSNT: CPT

## 2024-10-29 PROCEDURE — 1160F RVW MEDS BY RX/DR IN RCRD: CPT

## 2024-10-29 RX ORDER — HYDROXYZINE HYDROCHLORIDE 10 MG/1
TABLET, FILM COATED ORAL
Qty: 60 TABLET | Refills: 2 | Status: SHIPPED | OUTPATIENT
Start: 2024-10-29

## 2024-10-29 RX ORDER — METHYLPREDNISOLONE 4 MG/1
TABLET ORAL
Qty: 21 EACH | Refills: 0 | Status: SHIPPED | OUTPATIENT
Start: 2024-10-29

## 2024-10-29 RX ORDER — TRIAMCINOLONE ACETONIDE 1 MG/G
1 OINTMENT TOPICAL 2 TIMES DAILY
Qty: 80 G | Refills: 1 | Status: SHIPPED | OUTPATIENT
Start: 2024-10-29

## 2024-10-29 NOTE — PROGRESS NOTES
Chief Complaint  Rash and Constipation    Subjective          Rash  Pertinent negatives include no diarrhea, fatigue, fever, shortness of breath or vomiting.     History of Present Illness  Elva Fields patient is a 44-year-old female who presents for evaluation of rash and constipation.     She reports experiencing itching all over most of her body, which began one week ago. The itching intensifies after showering, but persists throughout the day. She has been taking Benadryl to manage the symptoms, which also aids her sleep. She has not changed her use of soaps, lotions, or detergents, and has not started any new medications. She has not been exposed to anyone with a similar rash. The itching occasionally brings her to tears. There is no discharge or bleeding from the rash. She has not changed her diet or been exposed to new pets, insects, or plants. She tried peanut butter crumble cookies for the first time, and then the rash and itching started soon after. She has no known food allergies. The rash appeared a few days after consuming the cookies and has progressively worsened. She has no known drug allergies.    She also reports constipation, with bowel movements occurring only once every two days. She has tried over-the-counter stool softeners without success. She experiences straining during bowel movements and has a history of hemorrhoids. She maintains a high-fiber diet and drinks plenty of water. The constipation has been ongoing for the past 4 to 5 months.    ALLERGIES  She has no known drug allergies.      Review of Systems   Constitutional:  Negative for appetite change, chills, fatigue and fever.   Respiratory:  Negative for chest tightness and shortness of breath.    Cardiovascular:  Negative for chest pain and palpitations.   Gastrointestinal:  Positive for constipation. Negative for abdominal distention, abdominal pain, anal bleeding, blood in stool, diarrhea, nausea, rectal pain and vomiting.  "  Genitourinary:  Negative for difficulty urinating.   Musculoskeletal:  Negative for arthralgias, gait problem, joint swelling and neck pain.   Skin:  Positive for rash. Negative for wound.   Neurological:  Negative for dizziness, light-headedness and numbness.        Objective   Vital Signs:   /66 (BP Location: Left arm, Patient Position: Sitting, Cuff Size: Adult)   Pulse 74   Temp 98 °F (36.7 °C) (Oral)   Ht 162.6 cm (64\")   Wt 76.8 kg (169 lb 6.4 oz)   SpO2 97%   BMI 29.08 kg/m²      BMI is >= 25 and <30. (Overweight) The following options were offered after discussion;: exercise counseling/recommendations and nutrition counseling/recommendations       Physical Exam  Vitals and nursing note reviewed.   Constitutional:       General: She is not in acute distress.     Appearance: She is well-developed and overweight.   HENT:      Head: Normocephalic and atraumatic.   Eyes:      General: No scleral icterus.        Right eye: No discharge.         Left eye: No discharge.      Conjunctiva/sclera: Conjunctivae normal.      Pupils: Pupils are equal, round, and reactive to light.   Cardiovascular:      Rate and Rhythm: Normal rate.   Pulmonary:      Effort: Pulmonary effort is normal. No respiratory distress.      Breath sounds: Normal breath sounds.   Abdominal:      General: There is no distension.      Palpations: There is no mass.   Musculoskeletal:         General: Normal range of motion.      Cervical back: Normal range of motion and neck supple.   Skin:     General: Skin is warm and dry.      Findings: Rash present. Rash is papular.      Comments: Small papular rash located on bilateral arms, abdomen, and back. No drainage or bleeding.   Neurological:      Mental Status: She is alert and oriented to person, place, and time.      Motor: No abnormal muscle tone.   Psychiatric:         Behavior: Behavior normal.         Thought Content: Thought content normal.         Judgment: Judgment normal.      "   Physical Exam        Results                 Assessment and Plan    Assessment & Plan  1. Rash.  The rash started about a week ago and is very itchy, affecting various parts of the body including the arms, back, abdomen, and buttocks. No new soaps, lotions, detergents, or medications have been introduced. The patient mentioned trying a new peanut butter crumble cookie, which could be a potential allergen. A Medrol Dosepak was prescribed to alleviate the itching and histamine response. Daily Zyrtec was recommended as it does not cause drowsiness like Benadryl. A steroid cream was prescribed for application on the affected areas. Hydroxyzine was prescribed for use as needed for itching, with a caution about potential drowsiness. She was advised to avoid driving or operating heavy machinery while on this medication. Adequate hydration was emphasized. A referral for allergy testing was made to confirm any potential peanut or peanut butter allergy.    2. Constipation.  The patient has been experiencing constipation for the past four to five months, with bowel movements occurring every one to two days and often requiring straining. Increased water intake was recommended. OTC products have been ineffective, so Linzess was prescribed for daily morning use, with a 30-minute wait before breakfast. She was advised to avoid straining during bowel movements and to use a stool softener as needed. If diarrhea occurs with Linzess, she was advised to reduce the frequency to every other day. If diarrhea persists, discontinuation of Linzess was recommended.       Assessment & Plan  Allergic dermatitis    Orders:    methylPREDNISolone (MEDROL) 4 MG dose pack; Take as directed on package instructions.    triamcinolone (KENALOG) 0.1 % ointment; Apply 1 Application topically to the appropriate area as directed 2 (Two) Times a Day.    hydrOXYzine (ATARAX) 10 MG tablet; Take 1-3 tablets by mouth as needed for itching.    Ambulatory  Referral to Allergy    Urticaria due to food allergy    Orders:    Ambulatory Referral to Allergy    Chronic constipation    Orders:    linaclotide (Linzess) 145 MCG capsule capsule; Take 1 capsule by mouth Every Morning for 180 days.    Comprehensive metabolic panel    Lipid panel    CBC and Differential    TSH    IFG (impaired fasting glucose)    Orders:    Comprehensive metabolic panel    Hemoglobin A1c             Follow Up     Return if symptoms worsen or fail to improve.    Patient or patient representative verbalized consent for the use of Ambient Listening during the visit with  SHAYY Peñaloza for chart documentation. 10/29/2024  15:21 EDT    Patient was given instructions and counseling regarding her condition or for health maintenance advice. Please see specific information pulled into the AVS if appropriate.

## 2024-10-31 LAB
ALBUMIN SERPL-MCNC: 4 G/DL (ref 3.5–5.2)
ALBUMIN/GLOB SERPL: 1.7 G/DL
ALP SERPL-CCNC: 66 U/L (ref 39–117)
ALT SERPL-CCNC: 14 U/L (ref 1–33)
AST SERPL-CCNC: 14 U/L (ref 1–32)
BASOPHILS # BLD AUTO: ABNORMAL 10*3/UL
BILIRUB SERPL-MCNC: 0.4 MG/DL (ref 0–1.2)
BUN SERPL-MCNC: 11 MG/DL (ref 6–20)
BUN/CREAT SERPL: 15.9 (ref 7–25)
CALCIUM SERPL-MCNC: 8.6 MG/DL (ref 8.6–10.5)
CHLORIDE SERPL-SCNC: 103 MMOL/L (ref 98–107)
CHOLEST SERPL-MCNC: 177 MG/DL (ref 0–200)
CO2 SERPL-SCNC: 26 MMOL/L (ref 22–29)
CREAT SERPL-MCNC: 0.69 MG/DL (ref 0.57–1)
DIFFERENTIAL COMMENT: NORMAL
EGFRCR SERPLBLD CKD-EPI 2021: 109.9 ML/MIN/1.73
EOSINOPHIL # BLD AUTO: ABNORMAL 10*3/UL
EOSINOPHIL # BLD MANUAL: 0.07 10*3/MM3 (ref 0–0.4)
EOSINOPHIL NFR BLD AUTO: ABNORMAL %
EOSINOPHIL NFR BLD MANUAL: 1 % (ref 0.3–6.2)
ERYTHROCYTE [DISTWIDTH] IN BLOOD BY AUTOMATED COUNT: 15.5 % (ref 12.3–15.4)
GLOBULIN SER CALC-MCNC: 2.3 GM/DL
GLUCOSE SERPL-MCNC: 97 MG/DL (ref 65–99)
HBA1C MFR BLD: 4.9 % (ref 4.8–5.6)
HCT VFR BLD AUTO: 30.7 % (ref 34–46.6)
HDLC SERPL-MCNC: 50 MG/DL (ref 40–60)
HGB BLD-MCNC: 9.8 G/DL (ref 12–15.9)
LDLC SERPL CALC-MCNC: 111 MG/DL (ref 0–100)
LYMPHOCYTES # BLD AUTO: ABNORMAL 10*3/UL
LYMPHOCYTES # BLD MANUAL: 1.52 10*3/MM3 (ref 0.7–3.1)
LYMPHOCYTES NFR BLD AUTO: ABNORMAL %
LYMPHOCYTES NFR BLD MANUAL: 21.2 % (ref 19.6–45.3)
MCH RBC QN AUTO: 23.6 PG (ref 26.6–33)
MCHC RBC AUTO-ENTMCNC: 31.9 G/DL (ref 31.5–35.7)
MCV RBC AUTO: 74 FL (ref 79–97)
MONOCYTES # BLD MANUAL: 0.44 10*3/MM3 (ref 0.1–0.9)
MONOCYTES NFR BLD AUTO: ABNORMAL %
MONOCYTES NFR BLD MANUAL: 6.1 % (ref 5–12)
NEUTROPHILS # BLD MANUAL: 5.14 10*3/MM3 (ref 1.7–7)
NEUTROPHILS NFR BLD AUTO: ABNORMAL %
NEUTROPHILS NFR BLD MANUAL: 71.7 % (ref 42.7–76)
PLATELET # BLD AUTO: 330 10*3/MM3 (ref 140–450)
PLATELET BLD QL SMEAR: NORMAL
POTASSIUM SERPL-SCNC: 4.4 MMOL/L (ref 3.5–5.2)
PROT SERPL-MCNC: 6.3 G/DL (ref 6–8.5)
RBC # BLD AUTO: 4.15 10*6/MM3 (ref 3.77–5.28)
RBC MORPH BLD: NORMAL
SODIUM SERPL-SCNC: 137 MMOL/L (ref 136–145)
TRIGL SERPL-MCNC: 88 MG/DL (ref 0–150)
TSH SERPL DL<=0.005 MIU/L-ACNC: 1.61 UIU/ML (ref 0.27–4.2)
VLDLC SERPL CALC-MCNC: 16 MG/DL (ref 5–40)
WBC # BLD AUTO: 7.17 10*3/MM3 (ref 3.4–10.8)

## 2024-11-10 DIAGNOSIS — D64.9 ANEMIA, UNSPECIFIED TYPE: ICD-10-CM

## 2024-11-10 RX ORDER — FERROUS SULFATE 325(65) MG
TABLET ORAL
Qty: 30 TABLET | Refills: 2 | Status: SHIPPED | OUTPATIENT
Start: 2024-11-10